# Patient Record
Sex: FEMALE | Race: BLACK OR AFRICAN AMERICAN | NOT HISPANIC OR LATINO | Employment: UNEMPLOYED | URBAN - METROPOLITAN AREA
[De-identification: names, ages, dates, MRNs, and addresses within clinical notes are randomized per-mention and may not be internally consistent; named-entity substitution may affect disease eponyms.]

---

## 2023-09-06 LAB — URINE CULTURE: NO GROWTH

## 2023-09-12 RX ORDER — PYRIDOXINE HCL (VITAMIN B6) 100 MG
100 TABLET ORAL DAILY
COMMUNITY
End: 2024-04-24 | Stop reason: WASHOUT

## 2023-09-12 RX ORDER — PRENATAL VIT,CAL 74/IRON/FOLIC 27 MG-1 MG
TABLET ORAL
COMMUNITY
End: 2024-04-24 | Stop reason: WASHOUT

## 2023-09-19 LAB
ERYTHROCYTE DISTRIBUTION WIDTH (RATIO) BY AUTOMATED COUNT: 14.1 % (ref 11.5–14.5)
ERYTHROCYTE MEAN CORPUSCULAR HEMOGLOBIN CONCENTRATION (G/DL) BY AUTOMATED: 31.8 G/DL (ref 32–36)
ERYTHROCYTE MEAN CORPUSCULAR VOLUME (FL) BY AUTOMATED COUNT: 89 FL (ref 80–100)
ERYTHROCYTES (10*6/UL) IN BLOOD BY AUTOMATED COUNT: 3.72 X10E12/L (ref 4–5.2)
HEMATOCRIT (%) IN BLOOD BY AUTOMATED COUNT: 33 % (ref 36–46)
HEMOGLOBIN (G/DL) IN BLOOD: 10.5 G/DL (ref 12–16)
LEUKOCYTES (10*3/UL) IN BLOOD BY AUTOMATED COUNT: 8.3 X10E9/L (ref 4.4–11.3)
PLATELETS (10*3/UL) IN BLOOD AUTOMATED COUNT: 359 X10E9/L (ref 150–450)
REFLEX ADDED, ANEMIA PANEL: ABNORMAL

## 2023-09-20 LAB
ABO GROUP (TYPE) IN BLOOD: NORMAL
ANTIBODY SCREEN: NORMAL
FERRITIN, PREGNANCY: 48 UG/L
FOLATE, SERUM, PREGNANCY: >24 NG/ML
HEPATITIS B VIRUS SURFACE AG PRESENCE IN SERUM: NONREACTIVE
IRON (UG/DL) IN SER/PLAS IN PREGNANCY: 94 UG/DL
IRON BINDING CAPACITY (UG/DL) IN PREGNANCY: 308 UG/DL
IRON SATURATION (%) IN PREGNANCY: 31 %
RH FACTOR: NORMAL
RUBELLA VIRUS IGG AB: POSITIVE
SYPHILIS TOTAL AB: NONREACTIVE
VITAMIN B12, PREGNANCY: 204 PG/ML

## 2023-09-21 LAB — HIV 1/ 2 AG/AB SCREEN: NONREACTIVE

## 2023-09-28 LAB — SMA RESULT: NORMAL

## 2023-09-29 LAB — CF RESULTS: NORMAL

## 2023-10-03 ENCOUNTER — ROUTINE PRENATAL (OUTPATIENT)
Dept: OBSTETRICS AND GYNECOLOGY | Facility: CLINIC | Age: 28
End: 2023-10-03
Payer: MEDICAID

## 2023-10-03 VITALS — WEIGHT: 163 LBS | DIASTOLIC BLOOD PRESSURE: 64 MMHG | SYSTOLIC BLOOD PRESSURE: 104 MMHG

## 2023-10-03 DIAGNOSIS — Z3A.17 17 WEEKS GESTATION OF PREGNANCY (HHS-HCC): ICD-10-CM

## 2023-10-03 DIAGNOSIS — Z34.02 PRIMIPAROUS, SECOND TRIMESTER (HHS-HCC): Primary | ICD-10-CM

## 2023-10-03 DIAGNOSIS — O99.012 ANEMIA AFFECTING PREGNANCY IN SECOND TRIMESTER (HHS-HCC): ICD-10-CM

## 2023-10-03 PROCEDURE — 99213 OFFICE O/P EST LOW 20 MIN: CPT | Performed by: OBSTETRICS & GYNECOLOGY

## 2023-10-03 NOTE — ASSESSMENT & PLAN NOTE
Doing well with no concerns  Reviewed lab work from [facility] ultrasound  Will obtain hemoglobin electrophoresis, has appointment with hematology on October 9  We will start patient on ASA 2 daily  Anatomy ultrasound scheduled October 16  PLAN:  Hemoglobin electrophoresis and follow-up in 4 weeks

## 2023-10-03 NOTE — PROGRESS NOTES
Subjective   Patient ID [id number]   Briana Phelps is a 28 y.o.  at 17w2d with a working estimated date of delivery of 3/10/2024, by Last Menstrual Period who presents for a routine prenatal visit. She denies vaginal bleeding, leakage of fluid, decreased fetal movements, or contractions.    Patient doing well with no concerns    Objective   Physical Exam  Weight: 73.9 kg (163 lb)  Expected Total Weight Gain: Could not be calculated   Pregravid BMI: Could not be calculated  BP: 104/64  Fetal Heart Rate: 150 Fundal Height (cm): 20 cm    Prenatal Labs  Urine dip:  Lab Results   Component Value Date    KETONESU Negative 2023       Lab Results   Component Value Date    HGB 10.1 (L) 2023    HCT 31.2 (L) 2023    HEPBSAG NONREACTIVE 2023              Assessment/Plan   Problem List Items Addressed This Visit             ICD-10-CM    Primiparous, second trimester - Primary Z34.02     Doing well with no concerns  Reviewed lab work from [facility] ultrasound  Will obtain hemoglobin electrophoresis, has appointment with hematology on   We will start patient on ASA 2 daily  Anatomy ultrasound scheduled   PLAN:  Hemoglobin electrophoresis and follow-up in 4 weeks         Anemia affecting pregnancy in second trimester O99.012     Hgb 10.1 with MCV of 88 and ferritin 48  Patient denies sickle cell  We will obtain hemoglobin electrophoresis  Scheduled for hematology consult on          17 weeks gestation of pregnancy Z3A.17

## 2023-10-03 NOTE — ASSESSMENT & PLAN NOTE
Hgb 10.1 with MCV of 88 and ferritin 48  Patient denies sickle cell  We will obtain hemoglobin electrophoresis  Scheduled for hematology consult on October 9

## 2023-10-09 ENCOUNTER — OFFICE VISIT (OUTPATIENT)
Dept: HEMATOLOGY/ONCOLOGY | Facility: CLINIC | Age: 28
End: 2023-10-09
Payer: MEDICAID

## 2023-10-09 VITALS
BODY MASS INDEX: 26.93 KG/M2 | HEIGHT: 66 IN | OXYGEN SATURATION: 100 % | WEIGHT: 167.55 LBS | SYSTOLIC BLOOD PRESSURE: 136 MMHG | HEART RATE: 111 BPM | TEMPERATURE: 98.2 F | RESPIRATION RATE: 18 BRPM | DIASTOLIC BLOOD PRESSURE: 77 MMHG

## 2023-10-09 DIAGNOSIS — D63.8 ANEMIA OF CHRONIC DISEASE: Primary | ICD-10-CM

## 2023-10-09 LAB
ALBUMIN SERPL BCP-MCNC: 3.8 G/DL (ref 3.4–5)
ALP SERPL-CCNC: 44 U/L (ref 33–110)
ALT SERPL W P-5'-P-CCNC: 35 U/L (ref 7–45)
ANION GAP SERPL CALC-SCNC: 9 MMOL/L (ref 10–20)
AST SERPL W P-5'-P-CCNC: 23 U/L (ref 9–39)
BASOPHILS # BLD AUTO: 0.02 X10*3/UL (ref 0–0.1)
BASOPHILS NFR BLD AUTO: 0.3 %
BILIRUB SERPL-MCNC: 0.3 MG/DL (ref 0–1.2)
BUN SERPL-MCNC: 5 MG/DL (ref 6–23)
CALCIUM SERPL-MCNC: 9.4 MG/DL (ref 8.6–10.3)
CHLORIDE SERPL-SCNC: 106 MMOL/L (ref 98–107)
CO2 SERPL-SCNC: 23 MMOL/L (ref 21–32)
CREAT SERPL-MCNC: 0.49 MG/DL (ref 0.5–1.05)
EOSINOPHIL # BLD AUTO: 0.05 X10*3/UL (ref 0–0.7)
EOSINOPHIL NFR BLD AUTO: 0.6 %
ERYTHROCYTE [DISTWIDTH] IN BLOOD BY AUTOMATED COUNT: 13.9 % (ref 11.5–14.5)
FERRITIN SERPL-MCNC: 29 NG/ML (ref 8–150)
FOLATE SERPL-MCNC: 21.9 NG/ML
GFR SERPL CREATININE-BSD FRML MDRD: >90 ML/MIN/1.73M*2
GLUCOSE SERPL-MCNC: 89 MG/DL (ref 74–99)
HCT VFR BLD AUTO: 31.6 % (ref 36–46)
HGB BLD-MCNC: 10.1 G/DL (ref 12–16)
IMM GRANULOCYTES # BLD AUTO: 0.04 X10*3/UL (ref 0–0.7)
IMM GRANULOCYTES NFR BLD AUTO: 0.5 % (ref 0–0.9)
LYMPHOCYTES # BLD AUTO: 1.48 X10*3/UL (ref 1.2–4.8)
LYMPHOCYTES NFR BLD AUTO: 19 %
MCH RBC QN AUTO: 28.4 PG (ref 26–34)
MCHC RBC AUTO-ENTMCNC: 32 G/DL (ref 32–36)
MCV RBC AUTO: 89 FL (ref 80–100)
MONOCYTES # BLD AUTO: 0.37 X10*3/UL (ref 0.1–1)
MONOCYTES NFR BLD AUTO: 4.7 %
NEUTROPHILS # BLD AUTO: 5.83 X10*3/UL (ref 1.2–7.7)
NEUTROPHILS NFR BLD AUTO: 74.9 %
PLATELET # BLD AUTO: 270 X10*3/UL (ref 150–450)
PMV BLD AUTO: 8.3 FL (ref 7.5–11.5)
POTASSIUM SERPL-SCNC: 3.5 MMOL/L (ref 3.5–5.3)
PROT SERPL-MCNC: 6.8 G/DL (ref 6.4–8.2)
RBC # BLD AUTO: 3.56 X10*6/UL (ref 4–5.2)
SODIUM SERPL-SCNC: 134 MMOL/L (ref 136–145)
TSH SERPL-ACNC: 1.18 MIU/L (ref 0.44–3.98)
VIT B12 SERPL-MCNC: 260 PG/ML (ref 211–911)
WBC # BLD AUTO: 7.8 X10*3/UL (ref 4.4–11.3)

## 2023-10-09 PROCEDURE — 82746 ASSAY OF FOLIC ACID SERUM: CPT | Performed by: NURSE PRACTITIONER

## 2023-10-09 PROCEDURE — 80053 COMPREHEN METABOLIC PANEL: CPT | Performed by: NURSE PRACTITIONER

## 2023-10-09 PROCEDURE — 83021 HEMOGLOBIN CHROMOTOGRAPHY: CPT | Performed by: NURSE PRACTITIONER

## 2023-10-09 PROCEDURE — 36415 COLL VENOUS BLD VENIPUNCTURE: CPT | Performed by: NURSE PRACTITIONER

## 2023-10-09 PROCEDURE — 83020 HEMOGLOBIN ELECTROPHORESIS: CPT | Performed by: NURSE PRACTITIONER

## 2023-10-09 PROCEDURE — 82607 VITAMIN B-12: CPT | Performed by: NURSE PRACTITIONER

## 2023-10-09 PROCEDURE — 82728 ASSAY OF FERRITIN: CPT | Performed by: NURSE PRACTITIONER

## 2023-10-09 PROCEDURE — 99215 OFFICE O/P EST HI 40 MIN: CPT | Performed by: NURSE PRACTITIONER

## 2023-10-09 PROCEDURE — 1036F TOBACCO NON-USER: CPT | Performed by: NURSE PRACTITIONER

## 2023-10-09 PROCEDURE — 85025 COMPLETE CBC W/AUTO DIFF WBC: CPT | Performed by: NURSE PRACTITIONER

## 2023-10-09 PROCEDURE — 84443 ASSAY THYROID STIM HORMONE: CPT | Performed by: NURSE PRACTITIONER

## 2023-10-09 PROCEDURE — 99205 OFFICE O/P NEW HI 60 MIN: CPT | Performed by: NURSE PRACTITIONER

## 2023-10-09 ASSESSMENT — PAIN SCALES - GENERAL: PAINLEVEL: 0-NO PAIN

## 2023-10-09 NOTE — PROGRESS NOTES
"Patient ID: Briana Phelps is a 28 y.o. female.  Referring Physician: Fely Hess  Visit Type: Initial Visit      Subjective    Location:  [facility] [place]  Initial consult: [id number]  Reason: gestational anemia  Referred by Fely Hess  Initial Consult.    Patient is a 29 yo  woman with a PMH of anemia  and was referred to benign hematology for consultation of  gestational anemia. .  Due date 2024. Recently hospitalized for abdominal pain. Remote history of anemia.     Today, patient presents for initial consultation. Denies abnormal weight loss, night sweats, fever. Denies fatigue, chills, sob, cp, n/v/d, n/t. No PICA. Denies any abnormal bleeding or bruising. No recurrent infections or lymphadenopathy. No joint/body pain. No known blood disorders in family. Has had surgery in past w/o issue. Never had blood/blood products. Denies NSAID use.     Noted that this patient has had a hemoglobin in the mid 10 range for the past few months. No other hematology labs noted.     Past Medical Hx: first pregnancy, remote history of iron deficiency anemia 2/2 to menorrhagia  Past Surgical Hx: Negative  Family Hx: No hemoglobinopathy or sickle cell disease, parents and siblings healthy: all live in [place].  Social Hx: no smoking, ETOH, or recreational drugs. Currently working full time        Review of Systems   All other systems reviewed and are negative.       Objective   BSA: 1.89 meters squared  /77 (BP Location: Right arm, Patient Position: Sitting, BP Cuff Size: Adult)   Pulse (!) 111   Temp 36.8 °C (98.2 °F) (Temporal)   Resp 18   Ht 1.689 m (5' 6.5\")   Wt 76 kg (167 lb 8.8 oz)   LMP 2023 (Exact Date)   SpO2 100%   BMI 26.64 kg/m²      has a past medical history of Nausea/vomiting in pregnancy.   has no past surgical history on file.  No family history on file.  Oncology History    No history exists.   Family History:    Briana Phelps  reports that she " "has never smoked. She has never used smokeless tobacco.  She  reports that she does not currently use alcohol.  She  reports that she does not currently use drugs.    Physical Exam  HENT:      Head: Normocephalic.      Nose: Nose normal.      Mouth/Throat:      Mouth: Mucous membranes are moist.   Eyes:      Pupils: Pupils are equal, round, and reactive to light.   Cardiovascular:      Rate and Rhythm: Normal rate and regular rhythm.      Pulses: Normal pulses.      Heart sounds: Normal heart sounds.   Pulmonary:      Effort: Pulmonary effort is normal.      Breath sounds: Normal breath sounds.   Abdominal:      General: Bowel sounds are normal.      Palpations: Abdomen is soft.   Musculoskeletal:         General: Normal range of motion.   Skin:     General: Skin is warm and dry.   Neurological:      General: No focal deficit present.      Mental Status: She is alert and oriented to person, place, and time.   Psychiatric:         Mood and Affect: Mood normal.         Behavior: Behavior normal.       WBC   Date/Time Value Ref Range Status   10/09/2023 11:14 AM 7.8 4.4 - 11.3 x10*3/uL Final   09/28/2023 02:34 AM 10.1 4.4 - 11.3 x10E9/L Final   09/19/2023 01:43 AM 8.3 4.4 - 11.3 x10E9/L Final     No results found for: \"NRBC\"  RBC   Date Value Ref Range Status   10/09/2023 3.56 (L) 4.00 - 5.20 x10*6/uL Final   09/28/2023 3.55 (L) 4.00 - 5.20 x10E12/L Final   09/19/2023 3.72 (L) 4.00 - 5.20 x10E12/L Final     Hemoglobin   Date Value Ref Range Status   10/09/2023 10.1 (L) 12.0 - 16.0 g/dL Final   09/28/2023 10.1 (L) 12.0 - 16.0 g/dL Final   09/19/2023 10.5 (L) 12.0 - 16.0 g/dL Final     Hematocrit   Date Value Ref Range Status   10/09/2023 31.6 (L) 36.0 - 46.0 % Final   09/28/2023 31.2 (L) 36.0 - 46.0 % Final   09/19/2023 33.0 (L) 36.0 - 46.0 % Final     MCV   Date/Time Value Ref Range Status   10/09/2023 11:14 AM 89 80 - 100 fL Final   09/28/2023 02:34 AM 88 80 - 100 fL Final   09/19/2023 01:43 AM 89 80 - 100 fL Final "     MCH   Date/Time Value Ref Range Status   10/09/2023 11:14 AM 28.4 26.0 - 34.0 pg Final     MCHC   Date/Time Value Ref Range Status   10/09/2023 11:14 AM 32.0 32.0 - 36.0 g/dL Final   09/28/2023 02:34 AM 32.4 32.0 - 36.0 g/dL Final   09/19/2023 01:43 AM 31.8 (L) 32.0 - 36.0 g/dL Final     RDW   Date/Time Value Ref Range Status   10/09/2023 11:14 AM 13.9 11.5 - 14.5 % Final   09/28/2023 02:34 AM 14.0 11.5 - 14.5 % Final   09/19/2023 01:43 AM 14.1 11.5 - 14.5 % Final     Platelets   Date/Time Value Ref Range Status   10/09/2023 11:14  150 - 450 x10*3/uL Final   09/28/2023 02:34  150 - 450 x10E9/L Final   09/19/2023 01:43  150 - 450 x10E9/L Final     MPV   Date/Time Value Ref Range Status   10/09/2023 11:14 AM 8.3 7.5 - 11.5 fL Final     Neutrophils %   Date/Time Value Ref Range Status   10/09/2023 11:14 AM 74.9 40.0 - 80.0 % Final   09/28/2023 02:34 AM 67.5 40.0 - 80.0 % Final     Immature Granulocytes %, Automated   Date/Time Value Ref Range Status   10/09/2023 11:14 AM 0.5 0.0 - 0.9 % Final     Comment:     Immature Granulocyte Count (IG) includes promyelocytes, myelocytes and metamyelocytes but does not include bands. Percent differential counts (%) should be interpreted in the context of the absolute cell counts (cells/UL).   09/28/2023 02:34 AM 0.5 0.0 - 0.9 % Final     Comment:      Immature Granulocyte Count (IG) includes promyelocytes,    myelocytes and metamyelocytes but does not include bands.   Percent differential counts (%) should be interpreted in the   context of the absolute cell counts (cells/L).       Lymphocytes %   Date/Time Value Ref Range Status   10/09/2023 11:14 AM 19.0 13.0 - 44.0 % Final   09/28/2023 02:34 AM 24.6 13.0 - 44.0 % Final     Monocytes %   Date/Time Value Ref Range Status   10/09/2023 11:14 AM 4.7 2.0 - 10.0 % Final   09/28/2023 02:34 AM 6.3 2.0 - 10.0 % Final     Eosinophils %   Date/Time Value Ref Range Status   10/09/2023 11:14 AM 0.6 0.0 - 6.0 % Final    2023 02:34 AM 0.9 0.0 - 6.0 % Final     Basophils %   Date/Time Value Ref Range Status   10/09/2023 11:14 AM 0.3 0.0 - 2.0 % Final   2023 02:34 AM 0.2 0.0 - 2.0 % Final     Neutrophils Absolute   Date/Time Value Ref Range Status   10/09/2023 11:14 AM 5.83 1.20 - 7.70 x10*3/uL Final     Comment:     Percent differential counts (%) should be interpreted in the context of the absolute cell counts (cells/uL).   2023 02:34 AM 6.80 1.20 - 7.70 x10E9/L Final     Immature Granulocytes Absolute, Automated   Date/Time Value Ref Range Status   10/09/2023 11:14 AM 0.04 0.00 - 0.70 x10*3/uL Final     Lymphocytes Absolute   Date/Time Value Ref Range Status   10/09/2023 11:14 AM 1.48 1.20 - 4.80 x10*3/uL Final   2023 02:34 AM 2.47 1.20 - 4.80 x10E9/L Final     Monocytes Absolute   Date/Time Value Ref Range Status   10/09/2023 11:14 AM 0.37 0.10 - 1.00 x10*3/uL Final   2023 02:34 AM 0.63 0.10 - 1.00 x10E9/L Final     Eosinophils Absolute   Date/Time Value Ref Range Status   10/09/2023 11:14 AM 0.05 0.00 - 0.70 x10*3/uL Final   2023 02:34 AM 0.09 0.00 - 0.70 x10E9/L Final     Basophils Absolute   Date/Time Value Ref Range Status   10/09/2023 11:14 AM 0.02 0.00 - 0.10 x10*3/uL Final   2023 02:34 AM 0.02 0.00 - 0.10 x10E9/L Final         Assessment/Plan    27 yo  woman with a PMH of anemia  nd was referred to benign hematology for consultation of  gestational anemia. .  Due date 2024. Recently hospitalized for abdominal pain. Remote history of iron deficiency anemia. [place] from [facility]. Patient is feeeling well with her first pregnancy. No complaints noted today. Denies fatigue, pica, bruising, bleeding MAHER or other symptoms of anemia.     Discussed possible etiologies including multifactorial, nutritional deficiencies, getational anemia, dilutional anemia, anemia of chronic disease, malabsorption, hemopathy, medications, bleeding, malignancy, etc.  Will start hematological workup today.     1. Labs today: CBC/diff, CMP, retics, iron studies, B12, folate, EPO level, TSH, PENNY. So far hemoglobin stable.   2. We will schedule a phone visit in 1 week to discuss results and make further recommendations.   3. Plan for monthly CBC diff and iron studies, patient should continue with prenatal vitamins and we will notify her if she needs additional supplementation.     I had an extensive discussion with the patient regarding the diagnosis and discussed the plan of therapy, including general considerations regarding side effects and outcomes. Pt understood and gave appropriate teach back about the plan of care. All questions were answered to the patient's satisfaction. The patient is instructed to contact us at any time if questions or problems arise. Thank you for the opportunity to participate in the care of this very pleasant patient.  Total time = 60 mins, of which >50% was spent in counseling and/or coordination of care including reviewing medical history/radiology/labs, examining patient, formulating outlined plan with team, and discussing plan with patient/family.    Rosanne M Casal, DNP, APN-BC, AOCNP

## 2023-10-10 LAB
HEMOGLOBIN A2: 2.6 % (ref 2–3.5)
HEMOGLOBIN A: 97.1 % (ref 95.8–98)
HEMOGLOBIN C: 0 %
HEMOGLOBIN F: 0.3 % (ref 0–2)
HEMOGLOBIN IDENTIFICATION INTERPRETATION: NORMAL
HEMOGLOBIN OTHER: 0 %
HEMOGLOBIN S: 0 %
PATH REVIEW-HGB IDENTIFICATION: NORMAL

## 2023-10-16 ENCOUNTER — ANCILLARY PROCEDURE (OUTPATIENT)
Dept: RADIOLOGY | Facility: CLINIC | Age: 28
End: 2023-10-16
Payer: MEDICAID

## 2023-10-16 DIAGNOSIS — O35.9XX0 FETAL ABNORMALITY AFFECTING MANAGEMENT OF MOTHER (HHS-HCC): ICD-10-CM

## 2023-10-16 PROCEDURE — 76811 OB US DETAILED SNGL FETUS: CPT

## 2023-10-16 PROCEDURE — 76811 OB US DETAILED SNGL FETUS: CPT | Performed by: OBSTETRICS & GYNECOLOGY

## 2023-10-24 ENCOUNTER — APPOINTMENT (OUTPATIENT)
Dept: OBSTETRICS AND GYNECOLOGY | Facility: CLINIC | Age: 28
End: 2023-10-24
Payer: MEDICAID

## 2023-10-24 PROBLEM — Z3A.20 20 WEEKS GESTATION OF PREGNANCY (HHS-HCC): Status: ACTIVE | Noted: 2023-10-03

## 2023-10-25 ENCOUNTER — ROUTINE PRENATAL (OUTPATIENT)
Dept: OBSTETRICS AND GYNECOLOGY | Facility: CLINIC | Age: 28
End: 2023-10-25
Payer: MEDICAID

## 2023-10-25 VITALS — DIASTOLIC BLOOD PRESSURE: 64 MMHG | SYSTOLIC BLOOD PRESSURE: 108 MMHG | BODY MASS INDEX: 26.55 KG/M2 | WEIGHT: 167 LBS

## 2023-10-25 DIAGNOSIS — Z3A.20 20 WEEKS GESTATION OF PREGNANCY (HHS-HCC): Primary | ICD-10-CM

## 2023-10-25 DIAGNOSIS — O99.012 ANEMIA DURING PREGNANCY IN SECOND TRIMESTER (HHS-HCC): ICD-10-CM

## 2023-10-25 DIAGNOSIS — Z34.02 PRIMIPAROUS, SECOND TRIMESTER (HHS-HCC): ICD-10-CM

## 2023-10-25 PROCEDURE — 99213 OFFICE O/P EST LOW 20 MIN: CPT | Performed by: OBSTETRICS & GYNECOLOGY

## 2023-10-25 RX ORDER — FERROUS SULFATE 325(65) MG
TABLET ORAL
Qty: 60 TABLET | Refills: 5 | Status: SHIPPED | OUTPATIENT
Start: 2023-10-25

## 2023-10-25 RX ORDER — DOCUSATE SODIUM 100 MG/1
CAPSULE, LIQUID FILLED ORAL
Qty: 60 CAPSULE | Refills: 5 | Status: SHIPPED | OUTPATIENT
Start: 2023-10-25 | End: 2024-01-16 | Stop reason: SDUPTHER

## 2023-10-25 NOTE — PROGRESS NOTES
Subjective   Patient ID [id number]   Briana Phelps is a 28 y.o.  at 20w3d with a working estimated date of delivery of 3/10/2024, by Last Menstrual Period who presents for a routine prenatal visit. She denies vaginal bleeding, leakage of fluid, decreased fetal movements, or contractions.    Patient continues with mild nausea, also occasional headache.  Patient otherwise doing well, positive fetal movement.  Reviewed ultrasound from , patient also saw hematologist on .  Patient is on vitamins and aspirin, will send prescription for iron.    Objective   Physical Exam  Weight: 75.8 kg (167 lb)  Expected Total Weight Gain: Could not be calculated   Pregravid BMI: Could not be calculated  BP: 108/64  Fetal Heart Rate: 150 Fundal Height (cm): 20 cm    Prenatal Labs  Urine dip:  Lab Results   Component Value Date    KETONESU Negative 2023       Lab Results   Component Value Date    HGB 10.1 (L) 10/09/2023    HCT 31.6 (L) 10/09/2023    ABO B 2023    HEPBSAG NONREACTIVE 2023         Assessment/Plan   Problem List Items Addressed This Visit             ICD-10-CM    Primiparous, second trimester Z34.02    20 weeks gestation of pregnancy - Primary Z3A.20     Patient with good fetal movement, on vitamins and ASA,   Patient did see hematology, will start on iron,  --Patient started on ASA 2 daily on October 3  --Normal anatomy scan on   -- ANEMIA: Hgb 10.1, MCV 88, ferritin 48, saw hematology 10/9, will start iron    PLAN:  Iron,  Follow-up in 4 weeks

## 2023-10-25 NOTE — ASSESSMENT & PLAN NOTE
Patient with good fetal movement, on vitamins and ASA,   Patient did see hematology, will start on iron,  --Patient started on ASA 2 daily on October 3  --Normal anatomy scan on October 17  -- ANEMIA: Hgb 10.1, MCV 88, ferritin 48, saw hematology 10/9, will start iron    PLAN:  Iron,  Follow-up in 4 weeks

## 2023-10-30 ENCOUNTER — APPOINTMENT (OUTPATIENT)
Dept: HEMATOLOGY/ONCOLOGY | Facility: CLINIC | Age: 28
End: 2023-10-30
Payer: COMMERCIAL

## 2023-11-06 ENCOUNTER — APPOINTMENT (OUTPATIENT)
Dept: HEMATOLOGY/ONCOLOGY | Facility: CLINIC | Age: 28
End: 2023-11-06
Payer: MEDICAID

## 2023-11-06 ENCOUNTER — APPOINTMENT (OUTPATIENT)
Dept: LAB | Facility: HOSPITAL | Age: 28
End: 2023-11-06
Payer: MEDICAID

## 2023-11-07 DIAGNOSIS — O99.012 ANEMIA AFFECTING PREGNANCY IN SECOND TRIMESTER (HHS-HCC): Primary | ICD-10-CM

## 2023-11-14 ENCOUNTER — TELEMEDICINE (OUTPATIENT)
Dept: HEMATOLOGY/ONCOLOGY | Facility: HOSPITAL | Age: 28
End: 2023-11-14
Payer: MEDICAID

## 2023-11-14 ENCOUNTER — APPOINTMENT (OUTPATIENT)
Dept: HEMATOLOGY/ONCOLOGY | Facility: CLINIC | Age: 28
End: 2023-11-14
Payer: MEDICAID

## 2023-11-14 DIAGNOSIS — O99.012 ANEMIA AFFECTING PREGNANCY IN SECOND TRIMESTER (HHS-HCC): Primary | ICD-10-CM

## 2023-11-14 DIAGNOSIS — D63.8 ANEMIA OF CHRONIC DISEASE: ICD-10-CM

## 2023-11-14 PROCEDURE — 99214 OFFICE O/P EST MOD 30 MIN: CPT | Performed by: NURSE PRACTITIONER

## 2023-11-15 ENCOUNTER — LAB (OUTPATIENT)
Dept: LAB | Facility: LAB | Age: 28
End: 2023-11-15
Payer: MEDICAID

## 2023-11-15 DIAGNOSIS — O99.012 ANEMIA AFFECTING PREGNANCY IN SECOND TRIMESTER (HHS-HCC): ICD-10-CM

## 2023-11-15 DIAGNOSIS — D63.8 ANEMIA OF CHRONIC DISEASE: ICD-10-CM

## 2023-11-15 LAB
ALBUMIN SERPL BCP-MCNC: 3.6 G/DL (ref 3.4–5)
ALP SERPL-CCNC: 50 U/L (ref 33–110)
ALT SERPL W P-5'-P-CCNC: 20 U/L (ref 7–45)
ANION GAP SERPL CALC-SCNC: 11 MMOL/L (ref 10–20)
AST SERPL W P-5'-P-CCNC: 17 U/L (ref 9–39)
BASOPHILS # BLD AUTO: 0.03 X10*3/UL (ref 0–0.1)
BASOPHILS NFR BLD AUTO: 0.3 %
BILIRUB SERPL-MCNC: 0.3 MG/DL (ref 0–1.2)
BUN SERPL-MCNC: 5 MG/DL (ref 6–23)
CALCIUM SERPL-MCNC: 8.7 MG/DL (ref 8.6–10.3)
CHLORIDE SERPL-SCNC: 105 MMOL/L (ref 98–107)
CO2 SERPL-SCNC: 24 MMOL/L (ref 21–32)
CREAT SERPL-MCNC: 0.57 MG/DL (ref 0.5–1.05)
EOSINOPHIL # BLD AUTO: 0.07 X10*3/UL (ref 0–0.7)
EOSINOPHIL NFR BLD AUTO: 0.7 %
ERYTHROCYTE [DISTWIDTH] IN BLOOD BY AUTOMATED COUNT: 13.6 % (ref 11.5–14.5)
FERRITIN SERPL-MCNC: 49 NG/ML (ref 8–150)
FOLATE SERPL-MCNC: 16.6 NG/ML
GFR SERPL CREATININE-BSD FRML MDRD: >90 ML/MIN/1.73M*2
GLUCOSE SERPL-MCNC: 77 MG/DL (ref 74–99)
HCT VFR BLD AUTO: 32.2 % (ref 36–46)
HGB BLD-MCNC: 10.3 G/DL (ref 12–16)
IMM GRANULOCYTES # BLD AUTO: 0.13 X10*3/UL (ref 0–0.7)
IMM GRANULOCYTES NFR BLD AUTO: 1.3 % (ref 0–0.9)
IRON SATN MFR SERPL: 19 % (ref 25–45)
IRON SERPL-MCNC: 61 UG/DL (ref 35–150)
LYMPHOCYTES # BLD AUTO: 1.79 X10*3/UL (ref 1.2–4.8)
LYMPHOCYTES NFR BLD AUTO: 18.5 %
MCH RBC QN AUTO: 28.9 PG (ref 26–34)
MCHC RBC AUTO-ENTMCNC: 32 G/DL (ref 32–36)
MCV RBC AUTO: 90 FL (ref 80–100)
MONOCYTES # BLD AUTO: 0.51 X10*3/UL (ref 0.1–1)
MONOCYTES NFR BLD AUTO: 5.3 %
NEUTROPHILS # BLD AUTO: 7.12 X10*3/UL (ref 1.2–7.7)
NEUTROPHILS NFR BLD AUTO: 73.9 %
NRBC BLD-RTO: 0 /100 WBCS (ref 0–0)
PLATELET # BLD AUTO: 323 X10*3/UL (ref 150–450)
POTASSIUM SERPL-SCNC: 3.7 MMOL/L (ref 3.5–5.3)
PROT SERPL-MCNC: 5.9 G/DL (ref 6.4–8.2)
RBC # BLD AUTO: 3.56 X10*6/UL (ref 4–5.2)
SODIUM SERPL-SCNC: 136 MMOL/L (ref 136–145)
TIBC SERPL-MCNC: 328 UG/DL (ref 240–445)
UIBC SERPL-MCNC: 267 UG/DL (ref 110–370)
VIT B12 SERPL-MCNC: 160 PG/ML (ref 211–911)
WBC # BLD AUTO: 9.7 X10*3/UL (ref 4.4–11.3)

## 2023-11-15 PROCEDURE — 83021 HEMOGLOBIN CHROMOTOGRAPHY: CPT

## 2023-11-15 PROCEDURE — 82607 VITAMIN B-12: CPT

## 2023-11-15 PROCEDURE — 85025 COMPLETE CBC W/AUTO DIFF WBC: CPT

## 2023-11-15 PROCEDURE — 83540 ASSAY OF IRON: CPT

## 2023-11-15 PROCEDURE — 80053 COMPREHEN METABOLIC PANEL: CPT

## 2023-11-15 PROCEDURE — 36415 COLL VENOUS BLD VENIPUNCTURE: CPT

## 2023-11-15 PROCEDURE — 83550 IRON BINDING TEST: CPT

## 2023-11-15 PROCEDURE — 83020 HEMOGLOBIN ELECTROPHORESIS: CPT

## 2023-11-15 PROCEDURE — 82746 ASSAY OF FOLIC ACID SERUM: CPT

## 2023-11-15 PROCEDURE — 82728 ASSAY OF FERRITIN: CPT

## 2023-11-16 LAB
HEMOGLOBIN A2: 2.5 % (ref 2–3.5)
HEMOGLOBIN A: 97.2 % (ref 95.8–98)
HEMOGLOBIN F: 0.3 % (ref 0–2)
HEMOGLOBIN IDENTIFICATION INTERPRETATION: NORMAL
PATH REVIEW-HGB IDENTIFICATION: NORMAL

## 2023-11-20 PROBLEM — Z3A.24 24 WEEKS GESTATION OF PREGNANCY (HHS-HCC): Status: ACTIVE | Noted: 2023-10-03

## 2023-11-21 ENCOUNTER — ROUTINE PRENATAL (OUTPATIENT)
Dept: OBSTETRICS AND GYNECOLOGY | Facility: CLINIC | Age: 28
End: 2023-11-21
Payer: MEDICAID

## 2023-11-21 VITALS — BODY MASS INDEX: 27.67 KG/M2 | SYSTOLIC BLOOD PRESSURE: 120 MMHG | DIASTOLIC BLOOD PRESSURE: 78 MMHG | WEIGHT: 174 LBS

## 2023-11-21 DIAGNOSIS — O99.612 CONSTIPATION DURING PREGNANCY IN SECOND TRIMESTER (HHS-HCC): ICD-10-CM

## 2023-11-21 DIAGNOSIS — K59.00 CONSTIPATION DURING PREGNANCY IN SECOND TRIMESTER (HHS-HCC): ICD-10-CM

## 2023-11-21 DIAGNOSIS — Z3A.24 24 WEEKS GESTATION OF PREGNANCY (HHS-HCC): ICD-10-CM

## 2023-11-21 DIAGNOSIS — O99.012 ANEMIA AFFECTING PREGNANCY IN SECOND TRIMESTER (HHS-HCC): Primary | ICD-10-CM

## 2023-11-21 PROCEDURE — 99213 OFFICE O/P EST LOW 20 MIN: CPT | Performed by: OBSTETRICS & GYNECOLOGY

## 2023-11-21 RX ORDER — VITAMIN A, VITAMIN C, VITAMIN D, VITAMIN E, THIAMINE, RIBOFLAVIN, NIACIN, VITAMIN B6, FOLIC ACID, VITAMIN B12, CALCIUM, IRON, ZINC, COPPER 4000; 120; 400; 22; 1.84; 3; 20; 10; 1; 12; 200; 27; 25; 2 [IU]/1; MG/1; [IU]/1; [IU]/1; MG/1; MG/1; MG/1; MG/1; MG/1; UG/1; MG/1; MG/1; MG/1; MG/1
1 TABLET ORAL DAILY
COMMUNITY
Start: 2023-07-11 | End: 2024-04-24 | Stop reason: WASHOUT

## 2023-11-21 RX ORDER — POLYETHYLENE GLYCOL 3350 17 G/17G
POWDER, FOR SOLUTION ORAL
Qty: 595 G | Refills: 2 | Status: SHIPPED | OUTPATIENT
Start: 2023-11-21

## 2023-11-21 NOTE — ASSESSMENT & PLAN NOTE
IUP at 24.2 weeks patient doing well with no concerns  Good fetal movement  Complaint of constipation, currently on Colace, will add MiraLAX    PLAN  50 g with CBC and RPR  Follow-up in 4 weeks

## 2023-11-21 NOTE — PROGRESS NOTES
Subjective     Briana Phelps is a 28 y.o.  at 24w2d with a working estimated date of delivery of 3/10/2024, by Last Menstrual Period who presents for a routine prenatal visit.     Patient with good fetal movement, doing well with no concerns  Complaint of constipation, currently on Colace    Objective   Physical Exam  Weight: 78.9 kg (174 lb)  Expected Total Weight Gain: 7 kg (15 lb)-11.5 kg (25 lb)   Pregravid BMI: 26.24  BP: 120/78  Fetal Heart Rate: 150 Fundal Height (cm): 24 cm    Prenatal Labs  Urine dip:  Lab Results   Component Value Date    KETONESU Negative 2023       Lab Results   Component Value Date    HGB 10.3 (L) 11/15/2023    HCT 32.2 (L) 11/15/2023    ABO B 2023    HEPBSAG NONREACTIVE 2023             Problem List Items Addressed This Visit       Anemia affecting pregnancy in second trimester - Primary    Overview     Hgb 10.1 with MCV of 88 and ferritin 48  Patient denies sickle cell  Normal hemoglobin electrophoresis  Followed by hematology         24 weeks gestation of pregnancy    Overview     --Labs at \[facility]"" 2023: Normal Pap,  GC and Chlamydia negative,   --Patient started on ASA 2 daily on October 3  --Normal anatomy scan on   -- ANEMIA: Hgb 10.1, MCV 88, ferritin 48, saw hematology 10/9, will start iron (nl Hgb electrophoresis)         Current Assessment & Plan     IUP at 24.2 weeks patient doing well with no concerns  Good fetal movement  Complaint of constipation, currently on Colace, will add MiraLAX    PLAN  50 g with CBC and RPR  Follow-up in 4 weeks         Relevant Orders    Glucose, 1 Hour Screen, Pregnancy    CBC Anemia Panel With Reflex, Pregnancy    Syphilis Screen with Reflex     Other Visit Diagnoses       Constipation during pregnancy in second trimester

## 2023-11-27 ENCOUNTER — LAB (OUTPATIENT)
Dept: LAB | Facility: LAB | Age: 28
End: 2023-11-27
Payer: MEDICAID

## 2023-11-27 DIAGNOSIS — Z3A.24 24 WEEKS GESTATION OF PREGNANCY (HHS-HCC): ICD-10-CM

## 2023-11-27 LAB
ERYTHROCYTE [DISTWIDTH] IN BLOOD BY AUTOMATED COUNT: 13.7 % (ref 11.5–14.5)
FERRITIN SERPL-MCNC: 37 NG/ML
FOLATE SERPL-MCNC: 20.3 NG/ML
GLUCOSE 1H P 50 G GLC PO SERPL-MCNC: 93 MG/DL
HCT VFR BLD AUTO: 34.2 % (ref 36–46)
HGB BLD-MCNC: 10.8 G/DL (ref 12–16)
IRON SATN MFR SERPL: 22 %
IRON SERPL-MCNC: 85 UG/DL
MCH RBC QN AUTO: 29.3 PG (ref 26–34)
MCHC RBC AUTO-ENTMCNC: 31.6 G/DL (ref 32–36)
MCV RBC AUTO: 93 FL (ref 80–100)
NRBC BLD-RTO: 0 /100 WBCS (ref 0–0)
PLATELET # BLD AUTO: 308 X10*3/UL (ref 150–450)
RBC # BLD AUTO: 3.69 X10*6/UL (ref 4–5.2)
REFLEX ADDED, ANEMIA PANEL: NORMAL
T PALLIDUM AB SER QL: NONREACTIVE
TIBC SERPL-MCNC: 394 UG/DL
UIBC SERPL-MCNC: 309 UG/DL
VIT B12 SERPL-MCNC: 222 PG/ML
WBC # BLD AUTO: 12 X10*3/UL (ref 4.4–11.3)

## 2023-11-27 PROCEDURE — 82607 VITAMIN B-12: CPT

## 2023-11-27 PROCEDURE — 82947 ASSAY GLUCOSE BLOOD QUANT: CPT

## 2023-11-27 PROCEDURE — 82746 ASSAY OF FOLIC ACID SERUM: CPT

## 2023-11-27 PROCEDURE — 83550 IRON BINDING TEST: CPT

## 2023-11-27 PROCEDURE — 36415 COLL VENOUS BLD VENIPUNCTURE: CPT

## 2023-11-27 PROCEDURE — 86780 TREPONEMA PALLIDUM: CPT

## 2023-11-27 PROCEDURE — 82728 ASSAY OF FERRITIN: CPT

## 2023-11-27 PROCEDURE — 85027 COMPLETE CBC AUTOMATED: CPT

## 2023-12-04 ENCOUNTER — APPOINTMENT (OUTPATIENT)
Dept: HEMATOLOGY/ONCOLOGY | Facility: CLINIC | Age: 28
End: 2023-12-04
Payer: MEDICAID

## 2023-12-06 NOTE — PROGRESS NOTES
"Patient ID: Briana Phelps is a 28 y.o. female.  Referring Physician: No referring provider defined for this encounter.  Primary Care Provider: No Assigned PCP Generic Provider, MD  Visit Type:  Follow Up     Verbal consent was requested and obtained from patient on this date for a telehealth visit.    Subjective    Telephone follow up for a 27 yo  woman with a PMH of anemia  and was referred to benign hematology for consultation of  gestational anemia. . Due date 2024. Recently hospitalized for abdominal pain. Remote history of anemia.      Denies abnormal weight loss, night sweats, fever. Denies fatigue, chills, sob, cp, n/v/d, n/t. No PICA. Denies any abnormal bleeding or bruising. No recurrent infections or lymphadenopathy. No joint/body pain. No known blood disorders in family. Has had surgery in past w/o issue. Never had blood/blood products. Denies NSAID use. Today complains of \"morning sickness\" and constipation. OB has prescribed a stool softener and medication for nausea that is helping relieve symptoms.      Noted that this patient has had a hemoglobin in the mid 10 range for the past few months. No other hematology labs noted.      Past Medical Hx: first pregnancy, remote history of iron deficiency anemia 2/2 to menorrhagia  Past Surgical Hx: Negative  Family Hx: No hemoglobinopathy or sickle cell disease, parents and siblings healthy: all live in [place].  Social Hx: no smoking, ETOH, or recreational drugs. Currently working full time    Review of Systems   Constitutional:  Positive for fatigue.   Gastrointestinal:  Positive for constipation and nausea.        Objective   BSA: There is no height or weight on file to calculate BSA.  LMP 2023 (Exact Date)      has a past medical history of Nausea/vomiting in pregnancy.   has no past surgical history on file.  No family history on file.  Oncology History    No history exists.       Briana Phelps  reports that she " has never smoked. She has never used smokeless tobacco.  She  reports that she does not currently use alcohol.  She  reports that she does not currently use drugs.      WBC   Date/Time Value Ref Range Status   11/27/2023 10:20 AM 12.0 (H) 4.4 - 11.3 x10*3/uL Final   11/15/2023 10:02 AM 9.7 4.4 - 11.3 x10*3/uL Final   10/09/2023 11:14 AM 7.8 4.4 - 11.3 x10*3/uL Final     nRBC   Date Value Ref Range Status   11/27/2023 0.0 0.0 - 0.0 /100 WBCs Final   11/15/2023 0.0 0.0 - 0.0 /100 WBCs Final     RBC   Date Value Ref Range Status   11/27/2023 3.69 (L) 4.00 - 5.20 x10*6/uL Final   11/15/2023 3.56 (L) 4.00 - 5.20 x10*6/uL Final   10/09/2023 3.56 (L) 4.00 - 5.20 x10*6/uL Final     Hemoglobin   Date Value Ref Range Status   11/27/2023 10.8 (L) 12.0 - 16.0 g/dL Final   11/15/2023 10.3 (L) 12.0 - 16.0 g/dL Final   10/09/2023 10.1 (L) 12.0 - 16.0 g/dL Final     Hematocrit   Date Value Ref Range Status   11/27/2023 34.2 (L) 36.0 - 46.0 % Final   11/15/2023 32.2 (L) 36.0 - 46.0 % Final   10/09/2023 31.6 (L) 36.0 - 46.0 % Final     MCV   Date/Time Value Ref Range Status   11/27/2023 10:20 AM 93 80 - 100 fL Final   11/15/2023 10:02 AM 90 80 - 100 fL Final   10/09/2023 11:14 AM 89 80 - 100 fL Final     MCH   Date/Time Value Ref Range Status   11/27/2023 10:20 AM 29.3 26.0 - 34.0 pg Final   11/15/2023 10:02 AM 28.9 26.0 - 34.0 pg Final   10/09/2023 11:14 AM 28.4 26.0 - 34.0 pg Final     MCHC   Date/Time Value Ref Range Status   11/27/2023 10:20 AM 31.6 (L) 32.0 - 36.0 g/dL Final   11/15/2023 10:02 AM 32.0 32.0 - 36.0 g/dL Final   10/09/2023 11:14 AM 32.0 32.0 - 36.0 g/dL Final     RDW   Date/Time Value Ref Range Status   11/27/2023 10:20 AM 13.7 11.5 - 14.5 % Final   11/15/2023 10:02 AM 13.6 11.5 - 14.5 % Final   10/09/2023 11:14 AM 13.9 11.5 - 14.5 % Final     Platelets   Date/Time Value Ref Range Status   11/27/2023 10:20  150 - 450 x10*3/uL Final   11/15/2023 10:02  150 - 450 x10*3/uL Final   10/09/2023 11:14   150 - 450 x10*3/uL Final     MPV   Date/Time Value Ref Range Status   10/09/2023 11:14 AM 8.3 7.5 - 11.5 fL Final     Neutrophils %   Date/Time Value Ref Range Status   11/15/2023 10:02 AM 73.9 40.0 - 80.0 % Final   10/09/2023 11:14 AM 74.9 40.0 - 80.0 % Final   09/28/2023 02:34 AM 67.5 40.0 - 80.0 % Final     Immature Granulocytes %, Automated   Date/Time Value Ref Range Status   11/15/2023 10:02 AM 1.3 (H) 0.0 - 0.9 % Final     Comment:     Immature Granulocyte Count (IG) includes promyelocytes, myelocytes and metamyelocytes but does not include bands. Percent differential counts (%) should be interpreted in the context of the absolute cell counts (cells/UL).   10/09/2023 11:14 AM 0.5 0.0 - 0.9 % Final     Comment:     Immature Granulocyte Count (IG) includes promyelocytes, myelocytes and metamyelocytes but does not include bands. Percent differential counts (%) should be interpreted in the context of the absolute cell counts (cells/UL).   09/28/2023 02:34 AM 0.5 0.0 - 0.9 % Final     Comment:      Immature Granulocyte Count (IG) includes promyelocytes,    myelocytes and metamyelocytes but does not include bands.   Percent differential counts (%) should be interpreted in the   context of the absolute cell counts (cells/L).       Lymphocytes %   Date/Time Value Ref Range Status   11/15/2023 10:02 AM 18.5 13.0 - 44.0 % Final   10/09/2023 11:14 AM 19.0 13.0 - 44.0 % Final   09/28/2023 02:34 AM 24.6 13.0 - 44.0 % Final     Monocytes %   Date/Time Value Ref Range Status   11/15/2023 10:02 AM 5.3 2.0 - 10.0 % Final   10/09/2023 11:14 AM 4.7 2.0 - 10.0 % Final   09/28/2023 02:34 AM 6.3 2.0 - 10.0 % Final     Eosinophils %   Date/Time Value Ref Range Status   11/15/2023 10:02 AM 0.7 0.0 - 6.0 % Final   10/09/2023 11:14 AM 0.6 0.0 - 6.0 % Final   09/28/2023 02:34 AM 0.9 0.0 - 6.0 % Final     Basophils %   Date/Time Value Ref Range Status   11/15/2023 10:02 AM 0.3 0.0 - 2.0 % Final   10/09/2023 11:14 AM 0.3 0.0 - 2.0 %  Final   2023 02:34 AM 0.2 0.0 - 2.0 % Final     Neutrophils Absolute   Date/Time Value Ref Range Status   11/15/2023 10:02 AM 7.12 1.20 - 7.70 x10*3/uL Final     Comment:     Percent differential counts (%) should be interpreted in the context of the absolute cell counts (cells/uL).   10/09/2023 11:14 AM 5.83 1.20 - 7.70 x10*3/uL Final     Comment:     Percent differential counts (%) should be interpreted in the context of the absolute cell counts (cells/uL).   2023 02:34 AM 6.80 1.20 - 7.70 x10E9/L Final     Immature Granulocytes Absolute, Automated   Date/Time Value Ref Range Status   11/15/2023 10:02 AM 0.13 0.00 - 0.70 x10*3/uL Final   10/09/2023 11:14 AM 0.04 0.00 - 0.70 x10*3/uL Final     Lymphocytes Absolute   Date/Time Value Ref Range Status   11/15/2023 10:02 AM 1.79 1.20 - 4.80 x10*3/uL Final   10/09/2023 11:14 AM 1.48 1.20 - 4.80 x10*3/uL Final   2023 02:34 AM 2.47 1.20 - 4.80 x10E9/L Final     Monocytes Absolute   Date/Time Value Ref Range Status   11/15/2023 10:02 AM 0.51 0.10 - 1.00 x10*3/uL Final   10/09/2023 11:14 AM 0.37 0.10 - 1.00 x10*3/uL Final   2023 02:34 AM 0.63 0.10 - 1.00 x10E9/L Final     Eosinophils Absolute   Date/Time Value Ref Range Status   11/15/2023 10:02 AM 0.07 0.00 - 0.70 x10*3/uL Final   10/09/2023 11:14 AM 0.05 0.00 - 0.70 x10*3/uL Final   2023 02:34 AM 0.09 0.00 - 0.70 x10E9/L Final     Basophils Absolute   Date/Time Value Ref Range Status   11/15/2023 10:02 AM 0.03 0.00 - 0.10 x10*3/uL Final   10/09/2023 11:14 AM 0.02 0.00 - 0.10 x10*3/uL Final   2023 02:34 AM 0.02 0.00 - 0.10 x10E9/L Final       Assessment/Plan   Assessment   27 yo  woman with a PMH of anemia  nd was referred to benign hematology for consultation of  gestational anemia. .  Due date 2024. Recently hospitalized for abdominal pain. Remote history of iron deficiency anemia. Recently moved to [place] from [place]. Patient is feeeling well with her first  pregnancy. No complaints noted today. Denies pica, bruising, bleeding MAHER or other symptoms of anemia. Endorses nausea and constipation which are being managed by OB.     Discussed possible etiologies including multifactorial, nutritional deficiencies, getational anemia, dilutional anemia, anemia of chronic disease, malabsorption, hemopathy, medications, bleeding, malignancy, etc. Patient taking po iron and other than constipation is tolerating it well. She has not had a chance to have labs drawn in the past month. We did discuss that hemoglobin electropheresis is normal. She was relieved to hear this.   We recommend checking monthly CBC, B12 and iron studies and we will see her back in February before she delivers.   The patient is in agreement with the following plan:    PLAN   1. Labs today: CBC/diff, CMP,  iron studies, B12, folate,   2. We will schedule a follow up visit in February.    3. Plan for monthly CBC diff and iron studies, patient should continue with prenatal vitamins and we will notify her if she needs additional supplementation.      I had an extensive discussion with the patient regarding the diagnosis and discussed the plan of therapy, including general considerations regarding side effects and outcomes. Pt understood and gave appropriate teach back about the plan of care. All questions were answered to the patient's satisfaction. The patient is instructed to contact us at any time if questions or problems arise. Thank you for the opportunity to participate in the care of this very pleasant patient.      Rosanne M Casal, MEME-CNP, DNP

## 2023-12-06 NOTE — PROGRESS NOTES
"Patient ID: Briana Phelps is a 28 y.o. female.  Referring Physician: No referring provider defined for this encounter.  Primary Care Provider: No Assigned PCP Generic Provider, MD  Visit Type:  Follow Up      Verbal consent was requested and obtained from patient on this date for a telehealth visit.        Subjective   Telephone follow up for a 27 yo  woman with a PMH of anemia  and was referred to benign hematology for consultation of  gestational anemia. . Due date 2024. Recently hospitalized for abdominal pain. Remote history of anemia.      Denies abnormal weight loss, night sweats, fever. Denies fatigue, chills, sob, cp, n/v/d, n/t. No PICA. Denies any abnormal bleeding or bruising. No recurrent infections or lymphadenopathy. No joint/body pain. No known blood disorders in family. Has had surgery in past w/o issue. Never had blood/blood products. Denies NSAID use. Today complains of \"morning sickness\" and constipation. OB has prescribed a stool softener and medication for nausea that is helping relieve symptoms.      Noted that this patient has had a hemoglobin in the mid 10 range for the past few months. No other hematology labs noted.      Past Medical Hx: first pregnancy, remote history of iron deficiency anemia 2/2 to menorrhagia  Past Surgical Hx: Negative  Family Hx: No hemoglobinopathy or sickle cell disease, parents and siblings healthy: all live in [place].  Social Hx: no smoking, ETOH, or recreational drugs. Currently working full time     Review of Systems   Constitutional:  Positive for fatigue.   Gastrointestinal:  Positive for constipation and nausea.               Objective   BSA: There is no height or weight on file to calculate BSA.  LMP 2023 (Exact Date)       has a past medical history of Nausea/vomiting in pregnancy.   has no past surgical history on file.  Family History   No family history on file.     Patient's Oncology History documentation     " "  Oncology History     No history exists.            Briana Phelps  reports that she has never smoked. She has never used smokeless tobacco.  She  reports that she does not currently use alcohol.  She  reports that she does not currently use drugs.              WBC   Date/Time Value Ref Range Status   10/09/2023 11:14 AM 7.8 4.4 - 11.3 x10*3/uL Final   09/28/2023 02:34 AM 10.1 4.4 - 11.3 x10E9/L Final   09/19/2023 01:43 AM 8.3 4.4 - 11.3 x10E9/L Final      No results found for: \"NRBC\"        RBC   Date Value Ref Range Status   10/09/2023 3.56 (L) 4.00 - 5.20 x10*6/uL Final   09/28/2023 3.55 (L) 4.00 - 5.20 x10E12/L Final   09/19/2023 3.72 (L) 4.00 - 5.20 x10E12/L Final            Hemoglobin   Date Value Ref Range Status   10/09/2023 10.1 (L) 12.0 - 16.0 g/dL Final   09/28/2023 10.1 (L) 12.0 - 16.0 g/dL Final   09/19/2023 10.5 (L) 12.0 - 16.0 g/dL Final            Hematocrit   Date Value Ref Range Status   10/09/2023 31.6 (L) 36.0 - 46.0 % Final   09/28/2023 31.2 (L) 36.0 - 46.0 % Final   09/19/2023 33.0 (L) 36.0 - 46.0 % Final            MCV   Date/Time Value Ref Range Status   10/09/2023 11:14 AM 89 80 - 100 fL Final   09/28/2023 02:34 AM 88 80 - 100 fL Final   09/19/2023 01:43 AM 89 80 - 100 fL Final            MCH   Date/Time Value Ref Range Status   10/09/2023 11:14 AM 28.4 26.0 - 34.0 pg Final            MCHC   Date/Time Value Ref Range Status   10/09/2023 11:14 AM 32.0 32.0 - 36.0 g/dL Final   09/28/2023 02:34 AM 32.4 32.0 - 36.0 g/dL Final   09/19/2023 01:43 AM 31.8 (L) 32.0 - 36.0 g/dL Final            RDW   Date/Time Value Ref Range Status   10/09/2023 11:14 AM 13.9 11.5 - 14.5 % Final   09/28/2023 02:34 AM 14.0 11.5 - 14.5 % Final   09/19/2023 01:43 AM 14.1 11.5 - 14.5 % Final            Platelets   Date/Time Value Ref Range Status   10/09/2023 11:14  150 - 450 x10*3/uL Final   09/28/2023 02:34  150 - 450 x10E9/L Final   09/19/2023 01:43  150 - 450 x10E9/L Final            MPV "   Date/Time Value Ref Range Status   10/09/2023 11:14 AM 8.3 7.5 - 11.5 fL Final            Neutrophils %   Date/Time Value Ref Range Status   10/09/2023 11:14 AM 74.9 40.0 - 80.0 % Final   09/28/2023 02:34 AM 67.5 40.0 - 80.0 % Final              Immature Granulocytes %, Automated   Date/Time Value Ref Range Status   10/09/2023 11:14 AM 0.5 0.0 - 0.9 % Final       Comment:       Immature Granulocyte Count (IG) includes promyelocytes, myelocytes and metamyelocytes but does not include bands. Percent differential counts (%) should be interpreted in the context of the absolute cell counts (cells/UL).   09/28/2023 02:34 AM 0.5 0.0 - 0.9 % Final       Comment:        Immature Granulocyte Count (IG) includes promyelocytes,    myelocytes and metamyelocytes but does not include bands.   Percent differential counts (%) should be interpreted in the   context of the absolute cell counts (cells/L).               Lymphocytes %   Date/Time Value Ref Range Status   10/09/2023 11:14 AM 19.0 13.0 - 44.0 % Final   09/28/2023 02:34 AM 24.6 13.0 - 44.0 % Final            Monocytes %   Date/Time Value Ref Range Status   10/09/2023 11:14 AM 4.7 2.0 - 10.0 % Final   09/28/2023 02:34 AM 6.3 2.0 - 10.0 % Final            Eosinophils %   Date/Time Value Ref Range Status   10/09/2023 11:14 AM 0.6 0.0 - 6.0 % Final   09/28/2023 02:34 AM 0.9 0.0 - 6.0 % Final            Basophils %   Date/Time Value Ref Range Status   10/09/2023 11:14 AM 0.3 0.0 - 2.0 % Final   09/28/2023 02:34 AM 0.2 0.0 - 2.0 % Final              Neutrophils Absolute   Date/Time Value Ref Range Status   10/09/2023 11:14 AM 5.83 1.20 - 7.70 x10*3/uL Final       Comment:       Percent differential counts (%) should be interpreted in the context of the absolute cell counts (cells/uL).   09/28/2023 02:34 AM 6.80 1.20 - 7.70 x10E9/L Final            Immature Granulocytes Absolute, Automated   Date/Time Value Ref Range Status   10/09/2023 11:14 AM 0.04 0.00 - 0.70 x10*3/uL Final             Lymphocytes Absolute   Date/Time Value Ref Range Status   10/09/2023 11:14 AM 1.48 1.20 - 4.80 x10*3/uL Final   2023 02:34 AM 2.47 1.20 - 4.80 x10E9/L Final            Monocytes Absolute   Date/Time Value Ref Range Status   10/09/2023 11:14 AM 0.37 0.10 - 1.00 x10*3/uL Final   2023 02:34 AM 0.63 0.10 - 1.00 x10E9/L Final            Eosinophils Absolute   Date/Time Value Ref Range Status   10/09/2023 11:14 AM 0.05 0.00 - 0.70 x10*3/uL Final   2023 02:34 AM 0.09 0.00 - 0.70 x10E9/L Final            Basophils Absolute   Date/Time Value Ref Range Status   10/09/2023 11:14 AM 0.02 0.00 - 0.10 x10*3/uL Final   2023 02:34 AM 0.02 0.00 - 0.10 x10E9/L Final               Assessment/Plan   Assessment   27 yo  woman with a PMH of anemia  nd was referred to benign hematology for consultation of  gestational anemia. .  Due date 2024. Recently hospitalized for abdominal pain. Remote history of iron deficiency anemia. [place] from [facility]. Patient is feeeling well with her first pregnancy. No complaints noted today. Denies pica, bruising, bleeding MAHER or other symptoms of anemia. Endorses nausea and constipation which are being managed by OB.     Discussed possible etiologies including multifactorial, nutritional deficiencies, getational anemia, dilutional anemia, anemia of chronic disease, malabsorption, hemopathy, medications, bleeding, malignancy, etc. Patient taking po iron and other than constipation is tolerating it well. She has not had a chance to have labs drawn in the past month. We did discuss that hemoglobin electropheresis is normal. She was relieved to hear this.   We recommend checking monthly CBC, B12 and iron studies and we will see her back in February before she delivers.   The patient is in agreement with the following plan:     PLAN   1. Labs today: CBC/diff, CMP,  iron studies, B12, folate,   2. We will schedule a follow up visit in  February.    3. Plan for monthly CBC diff and iron studies, patient should continue with prenatal vitamins and we will notify her if she needs additional supplementation.      I had an extensive discussion with the patient regarding the diagnosis and discussed the plan of therapy, including general considerations regarding side effects and outcomes. Pt understood and gave appropriate teach back about the plan of care. All questions were answered to the patient's satisfaction. The patient is instructed to contact us at any time if questions or problems arise. Thank you for the opportunity to participate in the care of this very pleasant patient.        Rosanne M Casal, APRN-CNP, DNP

## 2023-12-18 PROBLEM — Z3A.27 27 WEEKS GESTATION OF PREGNANCY (HHS-HCC): Status: ACTIVE | Noted: 2023-10-03

## 2023-12-19 ENCOUNTER — APPOINTMENT (OUTPATIENT)
Dept: OBSTETRICS AND GYNECOLOGY | Facility: CLINIC | Age: 28
End: 2023-12-19
Payer: MEDICAID

## 2023-12-22 ENCOUNTER — APPOINTMENT (OUTPATIENT)
Dept: OBSTETRICS AND GYNECOLOGY | Facility: CLINIC | Age: 28
End: 2023-12-22
Payer: MEDICAID

## 2024-01-13 PROBLEM — Z3A.32 32 WEEKS GESTATION OF PREGNANCY (HHS-HCC): Status: ACTIVE | Noted: 2023-10-03

## 2024-01-13 PROBLEM — Z34.03 FIRST PREGNANCY, THIRD TRIMESTER (HHS-HCC): Status: ACTIVE | Noted: 2023-10-03

## 2024-01-13 PROBLEM — O99.013 ANEMIA AFFECTING PREGNANCY IN THIRD TRIMESTER (HHS-HCC): Status: ACTIVE | Noted: 2023-10-03

## 2024-01-16 ENCOUNTER — ROUTINE PRENATAL (OUTPATIENT)
Dept: OBSTETRICS AND GYNECOLOGY | Facility: CLINIC | Age: 29
End: 2024-01-16
Payer: MEDICAID

## 2024-01-16 VITALS — BODY MASS INDEX: 28.91 KG/M2 | DIASTOLIC BLOOD PRESSURE: 78 MMHG | WEIGHT: 181.8 LBS | SYSTOLIC BLOOD PRESSURE: 116 MMHG

## 2024-01-16 DIAGNOSIS — Z23 ENCOUNTER FOR IMMUNIZATION: ICD-10-CM

## 2024-01-16 DIAGNOSIS — Z3A.32 32 WEEKS GESTATION OF PREGNANCY (HHS-HCC): ICD-10-CM

## 2024-01-16 DIAGNOSIS — O99.012 ANEMIA DURING PREGNANCY IN SECOND TRIMESTER (HHS-HCC): ICD-10-CM

## 2024-01-16 DIAGNOSIS — Z34.03 FIRST PREGNANCY, THIRD TRIMESTER (HHS-HCC): Primary | ICD-10-CM

## 2024-01-16 LAB
ERYTHROCYTE [DISTWIDTH] IN BLOOD BY AUTOMATED COUNT: 13.8 % (ref 11.5–14.5)
HCT VFR BLD AUTO: 32.6 % (ref 36–46)
HGB BLD-MCNC: 10.6 G/DL (ref 12–16)
MCH RBC QN AUTO: 29.3 PG (ref 26–34)
MCHC RBC AUTO-ENTMCNC: 32.5 G/DL (ref 32–36)
MCV RBC AUTO: 90 FL (ref 80–100)
NRBC BLD-RTO: 0 /100 WBCS (ref 0–0)
PLATELET # BLD AUTO: 269 X10*3/UL (ref 150–450)
RBC # BLD AUTO: 3.62 X10*6/UL (ref 4–5.2)
WBC # BLD AUTO: 12.7 X10*3/UL (ref 4.4–11.3)

## 2024-01-16 PROCEDURE — 90715 TDAP VACCINE 7 YRS/> IM: CPT | Performed by: OBSTETRICS & GYNECOLOGY

## 2024-01-16 PROCEDURE — 82746 ASSAY OF FOLIC ACID SERUM: CPT

## 2024-01-16 PROCEDURE — 82728 ASSAY OF FERRITIN: CPT

## 2024-01-16 PROCEDURE — 85027 COMPLETE CBC AUTOMATED: CPT

## 2024-01-16 PROCEDURE — 83550 IRON BINDING TEST: CPT

## 2024-01-16 PROCEDURE — 90471 IMMUNIZATION ADMIN: CPT | Performed by: OBSTETRICS & GYNECOLOGY

## 2024-01-16 PROCEDURE — 99213 OFFICE O/P EST LOW 20 MIN: CPT | Performed by: OBSTETRICS & GYNECOLOGY

## 2024-01-16 PROCEDURE — 82607 VITAMIN B-12: CPT

## 2024-01-16 PROCEDURE — 36415 COLL VENOUS BLD VENIPUNCTURE: CPT

## 2024-01-16 RX ORDER — DOCUSATE SODIUM 100 MG/1
CAPSULE, LIQUID FILLED ORAL
Qty: 60 CAPSULE | Refills: 5 | Status: SHIPPED | OUTPATIENT
Start: 2024-01-16

## 2024-01-16 NOTE — PROGRESS NOTES
Subjective     Briana Phelps is a 28 y.o.  at 32w2d with a working estimated date of delivery of 3/10/2024, by Last Menstrual Period who presents for a routine prenatal visit.     Patient with good fetal movement.  Complains of moderate nausea, will try wrist bands.  Patient also with fatigue, she is taking her iron, will repeat CBC.    Objective   Physical Exam  Weight: 82.5 kg (181 lb 12.8 oz)  Expected Total Weight Gain: 7 kg (15 lb)-11.5 kg (25 lb)   Pregravid BMI: 26.24  BP: 116/78  Fetal Heart Rate: 150 Fundal Height (cm): 33 cm    Prenatal Labs  Urine dip:  Lab Results   Component Value Date    KETONESU Negative 2023       Lab Results   Component Value Date    HGB 10.8 (L) 2023    HCT 34.2 (L) 2023    ABO B 2023    HEPBSAG NONREACTIVE 2023             Problem List Items Addressed This Visit       First pregnancy, third trimester - Primary    32 weeks gestation of pregnancy    Overview     --Labs at [facility] 2023: Normal Pap,  GC and Chlamydia negative,   --Patient started on ASA 2 daily on October 3  --Normal anatomy scan on , will repeat ultrasound at 36 weeks for growth  -- ANEMIA: Hgb 10.1, MCV 88, ferritin 48, saw hematology 10/9, will start iron (nl Hgb electrophoresis), repeat CBC today  -- Normal 50 g  --NAUSEA: Will try wrist bands did discuss option of Zofran         Current Assessment & Plan     IUP at 32.2 weeks  Good fetal movement.  Patient with complaint of fatigue, will repeat CBC, patient is on iron  Complaint of nausea, patient desires to try wrist bands  Will repeat ultrasound in 1 month for growth.    PLAN: Follow-up in 2 weeks         Relevant Orders    CBC Anemia Panel With Reflex, Pregnancy    US fetal biophysical profile wo non stress testing     Other Visit Diagnoses       Encounter for immunization        Relevant Orders    Tdap vaccine, age 7 years and older  (BOOSTRIX)

## 2024-01-16 NOTE — ASSESSMENT & PLAN NOTE
IUP at 32.2 weeks  Good fetal movement.  Patient with complaint of fatigue, will repeat CBC, patient is on iron  Complaint of nausea, patient desires to try wrist bands  Will repeat ultrasound in 1 month for growth.    PLAN: Follow-up in 2 weeks

## 2024-01-17 LAB
FERRITIN SERPL-MCNC: 26 NG/ML
FOLATE SERPL-MCNC: 20.7 NG/ML
IRON SATN MFR SERPL: 14 %
IRON SERPL-MCNC: 57 UG/DL
REFLEX ADDED, ANEMIA PANEL: NORMAL
TIBC SERPL-MCNC: 419 UG/DL
UIBC SERPL-MCNC: 362 UG/DL
VIT B12 SERPL-MCNC: 185 PG/ML

## 2024-01-29 PROBLEM — Z3A.34 34 WEEKS GESTATION OF PREGNANCY (HHS-HCC): Status: ACTIVE | Noted: 2023-10-03

## 2024-01-30 ENCOUNTER — ROUTINE PRENATAL (OUTPATIENT)
Dept: OBSTETRICS AND GYNECOLOGY | Facility: CLINIC | Age: 29
End: 2024-01-30
Payer: MEDICAID

## 2024-01-30 VITALS — BODY MASS INDEX: 29.73 KG/M2 | WEIGHT: 187 LBS

## 2024-01-30 DIAGNOSIS — Z3A.34 34 WEEKS GESTATION OF PREGNANCY (HHS-HCC): ICD-10-CM

## 2024-01-30 DIAGNOSIS — O99.013 ANEMIA AFFECTING PREGNANCY IN THIRD TRIMESTER (HHS-HCC): Primary | ICD-10-CM

## 2024-01-30 PROCEDURE — 99213 OFFICE O/P EST LOW 20 MIN: CPT | Performed by: OBSTETRICS & GYNECOLOGY

## 2024-01-30 NOTE — ASSESSMENT & PLAN NOTE
IUP at 34.2 weeks  Good fetal movement.  Patient unable to tolerate her iron, has hematology appointment on February 5    PLAN:  Follow-up in 2 weeks

## 2024-01-30 NOTE — PROGRESS NOTES
Subjective     Briana Phelps is a 28 y.o.  at 34w2d with a working estimated date of delivery of 3/10/2024, by Last Menstrual Period who presents for a routine prenatal visit.     Patient with good fetal movement, doing well with no concerns.    Objective   Physical Exam  Weight: 84.8 kg (187 lb)  Expected Total Weight Gain: 7 kg (15 lb)-11.5 kg (25 lb)   Pregravid BMI: 26.24     Fetal Heart Rate: 160 Fundal Height (cm): 34 cm    Prenatal Labs  Urine dip:  Lab Results   Component Value Date    KETONESU Negative 2023       Lab Results   Component Value Date    HGB 10.6 (L) 2024    HCT 32.6 (L) 2024    ABO B 2023    HEPBSAG NONREACTIVE 2023             Problem List Items Addressed This Visit       Anemia affecting pregnancy in third trimester - Primary    Overview     Hgb 10.1 with MCV of 88 and ferritin 48  Patient denies sickle cell  Normal hemoglobin electrophoresis  Followed by hematology         34 weeks gestation of pregnancy    Overview     --Labs at [facility] 2023: Normal Pap,  GC and Chlamydia negative,   --Patient started on ASA 2 daily on October 3  --Normal anatomy scan on , will repeat ultrasound at 36 weeks for growth, on   -- ANEMIA: Hgb 10.1, MCV 88, ferritin 48, saw hematology 10/9, will start iron (nl Hgb electrophoresis);  Hgb 10.6, patient unable to tolerate iron, did have ferritin of 48.  Will observe, has hematology appointment   -- Normal 50 g  --NAUSEA: Will try wrist bands did discuss option of Zofran         Current Assessment & Plan     IUP at 34.2 weeks  Good fetal movement.  Patient unable to tolerate her iron, has hematology appointment on     PLAN:  Follow-up in 2 weeks

## 2024-02-02 DIAGNOSIS — O99.013 ANEMIA AFFECTING PREGNANCY IN THIRD TRIMESTER (HHS-HCC): Primary | ICD-10-CM

## 2024-02-05 ENCOUNTER — APPOINTMENT (OUTPATIENT)
Dept: HEMATOLOGY/ONCOLOGY | Facility: CLINIC | Age: 29
End: 2024-02-05
Payer: MEDICAID

## 2024-02-05 ENCOUNTER — LAB (OUTPATIENT)
Dept: LAB | Facility: HOSPITAL | Age: 29
End: 2024-02-05
Payer: MEDICAID

## 2024-02-05 ENCOUNTER — APPOINTMENT (OUTPATIENT)
Dept: LAB | Facility: HOSPITAL | Age: 29
End: 2024-02-05
Payer: MEDICAID

## 2024-02-05 DIAGNOSIS — O99.013 ANEMIA AFFECTING PREGNANCY IN THIRD TRIMESTER (HHS-HCC): ICD-10-CM

## 2024-02-05 LAB
BASOPHILS # BLD AUTO: 0.02 X10*3/UL (ref 0–0.1)
BASOPHILS NFR BLD AUTO: 0.2 %
EOSINOPHIL # BLD AUTO: 0.06 X10*3/UL (ref 0–0.7)
EOSINOPHIL NFR BLD AUTO: 0.5 %
ERYTHROCYTE [DISTWIDTH] IN BLOOD BY AUTOMATED COUNT: 14.1 % (ref 11.5–14.5)
FOLATE SERPL-MCNC: >22.3 NG/ML
HCT VFR BLD AUTO: 32.2 % (ref 36–46)
HGB BLD-MCNC: 10.4 G/DL (ref 12–16)
IMM GRANULOCYTES # BLD AUTO: 0.35 X10*3/UL (ref 0–0.7)
IMM GRANULOCYTES NFR BLD AUTO: 3.1 % (ref 0–0.9)
LYMPHOCYTES # BLD AUTO: 1.68 X10*3/UL (ref 1.2–4.8)
LYMPHOCYTES NFR BLD AUTO: 15.1 %
MCH RBC QN AUTO: 28.7 PG (ref 26–34)
MCHC RBC AUTO-ENTMCNC: 32.3 G/DL (ref 32–36)
MCV RBC AUTO: 89 FL (ref 80–100)
MONOCYTES # BLD AUTO: 0.64 X10*3/UL (ref 0.1–1)
MONOCYTES NFR BLD AUTO: 5.7 %
NEUTROPHILS # BLD AUTO: 8.4 X10*3/UL (ref 1.2–7.7)
NEUTROPHILS NFR BLD AUTO: 75.4 %
PLATELET # BLD AUTO: 257 X10*3/UL (ref 150–450)
RBC # BLD AUTO: 3.62 X10*6/UL (ref 4–5.2)
VIT B12 SERPL-MCNC: 170 PG/ML (ref 211–911)
WBC # BLD AUTO: 11.2 X10*3/UL (ref 4.4–11.3)

## 2024-02-05 PROCEDURE — 83540 ASSAY OF IRON: CPT

## 2024-02-05 PROCEDURE — 82607 VITAMIN B-12: CPT

## 2024-02-05 PROCEDURE — 36415 COLL VENOUS BLD VENIPUNCTURE: CPT

## 2024-02-05 PROCEDURE — 85025 COMPLETE CBC W/AUTO DIFF WBC: CPT

## 2024-02-05 PROCEDURE — 80053 COMPREHEN METABOLIC PANEL: CPT

## 2024-02-05 PROCEDURE — 82728 ASSAY OF FERRITIN: CPT

## 2024-02-05 PROCEDURE — 82746 ASSAY OF FOLIC ACID SERUM: CPT

## 2024-02-06 ENCOUNTER — TELEMEDICINE (OUTPATIENT)
Dept: HEMATOLOGY/ONCOLOGY | Facility: HOSPITAL | Age: 29
End: 2024-02-06
Payer: MEDICAID

## 2024-02-06 ENCOUNTER — APPOINTMENT (OUTPATIENT)
Dept: LAB | Facility: HOSPITAL | Age: 29
End: 2024-02-06
Payer: MEDICAID

## 2024-02-06 DIAGNOSIS — E53.8 B12 DEFICIENCY: ICD-10-CM

## 2024-02-06 DIAGNOSIS — O99.013 ANEMIA AFFECTING PREGNANCY IN THIRD TRIMESTER (HHS-HCC): Primary | ICD-10-CM

## 2024-02-06 LAB
ALBUMIN SERPL BCP-MCNC: 3.7 G/DL (ref 3.4–5)
ALP SERPL-CCNC: 105 U/L (ref 33–110)
ALT SERPL W P-5'-P-CCNC: 10 U/L (ref 7–45)
ANION GAP SERPL CALC-SCNC: 13 MMOL/L (ref 10–20)
AST SERPL W P-5'-P-CCNC: 12 U/L (ref 9–39)
BILIRUB SERPL-MCNC: 0.4 MG/DL (ref 0–1.2)
BUN SERPL-MCNC: 6 MG/DL (ref 6–23)
CALCIUM SERPL-MCNC: 8.8 MG/DL (ref 8.6–10.3)
CHLORIDE SERPL-SCNC: 106 MMOL/L (ref 98–107)
CO2 SERPL-SCNC: 21 MMOL/L (ref 21–32)
CREAT SERPL-MCNC: 0.6 MG/DL (ref 0.5–1.05)
EGFRCR SERPLBLD CKD-EPI 2021: >90 ML/MIN/1.73M*2
FERRITIN SERPL-MCNC: 26 NG/ML (ref 8–150)
GLUCOSE SERPL-MCNC: 100 MG/DL (ref 74–99)
IRON SATN MFR SERPL: 26 % (ref 25–45)
IRON SERPL-MCNC: 108 UG/DL (ref 35–150)
POTASSIUM SERPL-SCNC: 4 MMOL/L (ref 3.5–5.3)
PROT SERPL-MCNC: 6.6 G/DL (ref 6.4–8.2)
SODIUM SERPL-SCNC: 136 MMOL/L (ref 136–145)
TIBC SERPL-MCNC: 410 UG/DL (ref 240–445)
UIBC SERPL-MCNC: 302 UG/DL (ref 110–370)

## 2024-02-06 PROCEDURE — 99213 OFFICE O/P EST LOW 20 MIN: CPT | Performed by: NURSE PRACTITIONER

## 2024-02-06 PROCEDURE — 1036F TOBACCO NON-USER: CPT | Performed by: NURSE PRACTITIONER

## 2024-02-06 RX ORDER — FAMOTIDINE 10 MG/ML
20 INJECTION INTRAVENOUS ONCE AS NEEDED
Status: CANCELLED | OUTPATIENT
Start: 2024-02-16

## 2024-02-06 RX ORDER — CYANOCOBALAMIN 1000 UG/ML
1000 INJECTION, SOLUTION INTRAMUSCULAR; SUBCUTANEOUS ONCE
Status: CANCELLED | OUTPATIENT
Start: 2024-02-16

## 2024-02-06 RX ORDER — EPINEPHRINE 0.3 MG/.3ML
0.3 INJECTION SUBCUTANEOUS EVERY 5 MIN PRN
Status: CANCELLED | OUTPATIENT
Start: 2024-02-16

## 2024-02-06 RX ORDER — ALBUTEROL SULFATE 0.83 MG/ML
3 SOLUTION RESPIRATORY (INHALATION) AS NEEDED
Status: CANCELLED | OUTPATIENT
Start: 2024-02-16

## 2024-02-06 RX ORDER — DIPHENHYDRAMINE HYDROCHLORIDE 50 MG/ML
50 INJECTION INTRAMUSCULAR; INTRAVENOUS AS NEEDED
Status: CANCELLED | OUTPATIENT
Start: 2024-02-16

## 2024-02-06 NOTE — PROGRESS NOTES
"Patient ID: Briana Phelps is a 28 y.o. female.  Referring Physician: Rosanne M Casal, APRN-CNP, DNP  [address] [place] [address]  Primary Care Provider: No Assigned PCP Generic Provider, MD  Visit Type:  Follow Up     Verbal consent was requested and obtained from patient on this date for a telehealth visit.    Subjective    Subjective   Telephone follow up for a 29 yo  woman with a PMH of anemia  and was referred to benign hematology for consultation of  gestational anemia. . Due date 2024. Recently hospitalized for abdominal pain. Remote history of anemia.      Denies abnormal weight loss, night sweats, fever. Denies fatigue, chills, sob, cp, n/v/d, n/t. No PICA. Denies any abnormal bleeding or bruising. No recurrent infections or lymphadenopathy. No joint/body pain. No known blood disorders in family. Has had surgery in past w/o issue. Never had blood/blood products. Denies NSAID use. Today complains of \"morning sickness\" and constipation. OB has prescribed a stool softener and medication for nausea that is helping relieve symptoms.      Noted that this patient has had a hemoglobin in the mid 10 range for the past few months. No other hematology labs noted.      Past Medical Hx: first pregnancy, remote history of iron deficiency anemia 2/2 to menorrhagia  Past Surgical Hx: Negative  Family Hx: No hemoglobinopathy or sickle cell disease, parents and siblings healthy: all live in [place].  Social Hx: no smoking, ETOH, or recreational drugs. Currently working full time     Review of Systems   Constitutional:  Positive for fatigue.   Gastrointestinal:  Positive for constipation and nausea.     Objective   BSA: There is no height or weight on file to calculate BSA.  LMP 2023 (Exact Date)      has a past medical history of Nausea/vomiting in pregnancy.   has no past surgical history on file.  No family history on file.  Oncology History    No history exists. "       Briana Phelps  reports that she has never smoked. She has never used smokeless tobacco.  She  reports that she does not currently use alcohol.  She  reports that she does not currently use drugs.    Physical Exam    WBC   Date/Time Value Ref Range Status   02/05/2024 01:11 PM 11.2 4.4 - 11.3 x10*3/uL Final   01/16/2024 04:14 PM 12.7 (H) 4.4 - 11.3 x10*3/uL Final   11/27/2023 10:20 AM 12.0 (H) 4.4 - 11.3 x10*3/uL Final     nRBC   Date Value Ref Range Status   01/16/2024 0.0 0.0 - 0.0 /100 WBCs Final   11/27/2023 0.0 0.0 - 0.0 /100 WBCs Final   11/15/2023 0.0 0.0 - 0.0 /100 WBCs Final     RBC   Date Value Ref Range Status   02/05/2024 3.62 (L) 4.00 - 5.20 x10*6/uL Final   01/16/2024 3.62 (L) 4.00 - 5.20 x10*6/uL Final   11/27/2023 3.69 (L) 4.00 - 5.20 x10*6/uL Final     Hemoglobin   Date Value Ref Range Status   02/05/2024 10.4 (L) 12.0 - 16.0 g/dL Final   01/16/2024 10.6 (L) 12.0 - 16.0 g/dL Final   11/27/2023 10.8 (L) 12.0 - 16.0 g/dL Final     Hematocrit   Date Value Ref Range Status   02/05/2024 32.2 (L) 36.0 - 46.0 % Final   01/16/2024 32.6 (L) 36.0 - 46.0 % Final   11/27/2023 34.2 (L) 36.0 - 46.0 % Final     MCV   Date/Time Value Ref Range Status   02/05/2024 01:11 PM 89 80 - 100 fL Final   01/16/2024 04:14 PM 90 80 - 100 fL Final   11/27/2023 10:20 AM 93 80 - 100 fL Final     MCH   Date/Time Value Ref Range Status   02/05/2024 01:11 PM 28.7 26.0 - 34.0 pg Final   01/16/2024 04:14 PM 29.3 26.0 - 34.0 pg Final   11/27/2023 10:20 AM 29.3 26.0 - 34.0 pg Final     MCHC   Date/Time Value Ref Range Status   02/05/2024 01:11 PM 32.3 32.0 - 36.0 g/dL Final   01/16/2024 04:14 PM 32.5 32.0 - 36.0 g/dL Final   11/27/2023 10:20 AM 31.6 (L) 32.0 - 36.0 g/dL Final     RDW   Date/Time Value Ref Range Status   02/05/2024 01:11 PM 14.1 11.5 - 14.5 % Final   01/16/2024 04:14 PM 13.8 11.5 - 14.5 % Final   11/27/2023 10:20 AM 13.7 11.5 - 14.5 % Final     Platelets   Date/Time Value Ref Range Status   02/05/2024 01:11 PM  257 150 - 450 x10*3/uL Final   01/16/2024 04:14  150 - 450 x10*3/uL Final   11/27/2023 10:20  150 - 450 x10*3/uL Final     MPV   Date/Time Value Ref Range Status   10/09/2023 11:14 AM 8.3 7.5 - 11.5 fL Final     Neutrophils %   Date/Time Value Ref Range Status   02/05/2024 01:11 PM 75.4 40.0 - 80.0 % Final   11/15/2023 10:02 AM 73.9 40.0 - 80.0 % Final   10/09/2023 11:14 AM 74.9 40.0 - 80.0 % Final     Immature Granulocytes %, Automated   Date/Time Value Ref Range Status   02/05/2024 01:11 PM 3.1 (H) 0.0 - 0.9 % Final     Comment:     Immature Granulocyte Count (IG) includes promyelocytes, myelocytes and metamyelocytes but does not include bands. Percent differential counts (%) should be interpreted in the context of the absolute cell counts (cells/UL).   11/15/2023 10:02 AM 1.3 (H) 0.0 - 0.9 % Final     Comment:     Immature Granulocyte Count (IG) includes promyelocytes, myelocytes and metamyelocytes but does not include bands. Percent differential counts (%) should be interpreted in the context of the absolute cell counts (cells/UL).   10/09/2023 11:14 AM 0.5 0.0 - 0.9 % Final     Comment:     Immature Granulocyte Count (IG) includes promyelocytes, myelocytes and metamyelocytes but does not include bands. Percent differential counts (%) should be interpreted in the context of the absolute cell counts (cells/UL).     Lymphocytes %   Date/Time Value Ref Range Status   02/05/2024 01:11 PM 15.1 13.0 - 44.0 % Final   11/15/2023 10:02 AM 18.5 13.0 - 44.0 % Final   10/09/2023 11:14 AM 19.0 13.0 - 44.0 % Final     Monocytes %   Date/Time Value Ref Range Status   02/05/2024 01:11 PM 5.7 2.0 - 10.0 % Final   11/15/2023 10:02 AM 5.3 2.0 - 10.0 % Final   10/09/2023 11:14 AM 4.7 2.0 - 10.0 % Final     Eosinophils %   Date/Time Value Ref Range Status   02/05/2024 01:11 PM 0.5 0.0 - 6.0 % Final   11/15/2023 10:02 AM 0.7 0.0 - 6.0 % Final   10/09/2023 11:14 AM 0.6 0.0 - 6.0 % Final     Basophils %   Date/Time Value  Ref Range Status   02/05/2024 01:11 PM 0.2 0.0 - 2.0 % Final   11/15/2023 10:02 AM 0.3 0.0 - 2.0 % Final   10/09/2023 11:14 AM 0.3 0.0 - 2.0 % Final     Neutrophils Absolute   Date/Time Value Ref Range Status   02/05/2024 01:11 PM 8.40 (H) 1.20 - 7.70 x10*3/uL Final     Comment:     Percent differential counts (%) should be interpreted in the context of the absolute cell counts (cells/uL).   11/15/2023 10:02 AM 7.12 1.20 - 7.70 x10*3/uL Final     Comment:     Percent differential counts (%) should be interpreted in the context of the absolute cell counts (cells/uL).   10/09/2023 11:14 AM 5.83 1.20 - 7.70 x10*3/uL Final     Comment:     Percent differential counts (%) should be interpreted in the context of the absolute cell counts (cells/uL).     Immature Granulocytes Absolute, Automated   Date/Time Value Ref Range Status   02/05/2024 01:11 PM 0.35 0.00 - 0.70 x10*3/uL Final   11/15/2023 10:02 AM 0.13 0.00 - 0.70 x10*3/uL Final   10/09/2023 11:14 AM 0.04 0.00 - 0.70 x10*3/uL Final     Lymphocytes Absolute   Date/Time Value Ref Range Status   02/05/2024 01:11 PM 1.68 1.20 - 4.80 x10*3/uL Final   11/15/2023 10:02 AM 1.79 1.20 - 4.80 x10*3/uL Final   10/09/2023 11:14 AM 1.48 1.20 - 4.80 x10*3/uL Final     Monocytes Absolute   Date/Time Value Ref Range Status   02/05/2024 01:11 PM 0.64 0.10 - 1.00 x10*3/uL Final   11/15/2023 10:02 AM 0.51 0.10 - 1.00 x10*3/uL Final   10/09/2023 11:14 AM 0.37 0.10 - 1.00 x10*3/uL Final     Eosinophils Absolute   Date/Time Value Ref Range Status   02/05/2024 01:11 PM 0.06 0.00 - 0.70 x10*3/uL Final   11/15/2023 10:02 AM 0.07 0.00 - 0.70 x10*3/uL Final   10/09/2023 11:14 AM 0.05 0.00 - 0.70 x10*3/uL Final     Basophils Absolute   Date/Time Value Ref Range Status   02/05/2024 01:11 PM 0.02 0.00 - 0.10 x10*3/uL Final   11/15/2023 10:02 AM 0.03 0.00 - 0.10 x10*3/uL Final   10/09/2023 11:14 AM 0.02 0.00 - 0.10 x10*3/uL Final       Assessment/Plan         Assessment/Plan   Assessment   27 yo   woman with a PMH of anemia  nd was referred to benign hematology for consultation of  gestational anemia. .  Due date 2024. Recently hospitalized for abdominal pain. Remote history of iron deficiency anemia. Recently moved to [place] from [place]. Patient is feeeling well with her first pregnancy. No complaints noted today. Denies pica, bruising, bleeding MAHER or other symptoms of anemia. Endorses nausea and constipation which are being managed by OB.     Discussed possible etiologies including multifactorial, nutritional deficiencies, getational anemia, dilutional anemia, anemia of chronic disease, malabsorption, hemopathy, medications, bleeding, malignancy, etc. Patient stopped oral iron due to constipation. Her hemoglobin has remained stable in the mid 10 range. Iron studies are on the low end of normal. B12 is low at 170. We did discuss that hemoglobin electropheresis is normal. She was relieved to hear this. I do not think IV iron is indicated for this patient since her iron studies are within normal limits. I do recommend monthly B12 injections.   The patient is in agreement with the following plan:     PLAN   B12 1000mcg subcutaneous monthly at [place] start ASAP  Will see her 6 to 8 weeks post partum for re-evaluation.  3.    CBC diff and iron studies 6 to 8 weeks postpartum  4.    Patient should continue with prenatal vitamins    I had an extensive discussion with the patient regarding the diagnosis and discussed the plan of therapy, including general considerations regarding side effects and outcomes. Pt understood and gave appropriate teach back about the plan of care. All questions were answered to the patient's satisfaction. The patient is instructed to contact us at any time if questions or problems arise. Thank you for the opportunity to participate in the care of this very pleasant patient.                    Rosanne M Casal, APRN-CNP, DNP

## 2024-02-12 PROBLEM — Z3A.35 35 WEEKS GESTATION OF PREGNANCY (HHS-HCC): Status: ACTIVE | Noted: 2023-10-03

## 2024-02-13 ENCOUNTER — APPOINTMENT (OUTPATIENT)
Dept: HEMATOLOGY/ONCOLOGY | Facility: HOSPITAL | Age: 29
End: 2024-02-13
Payer: MEDICAID

## 2024-02-13 ENCOUNTER — TELEPHONE (OUTPATIENT)
Dept: OBSTETRICS AND GYNECOLOGY | Facility: CLINIC | Age: 29
End: 2024-02-13
Payer: MEDICAID

## 2024-02-13 NOTE — TELEPHONE ENCOUNTER
Spoke with patient. She is 36.2 weeks today. She needs to have an appointment this week. Patient added to Dr. Olson's schedule for tomorrow at 8am.

## 2024-02-13 NOTE — TELEPHONE ENCOUNTER
Patient called stating she is concerned about not being scheduled for weekly appts. She has an US on 2/16/24 and her next appt is 2/21/24. She believes she should be seen sooner.  Her last appt was 1/30/24. She is 36w2d pregnant. Please advise

## 2024-02-14 ENCOUNTER — ROUTINE PRENATAL (OUTPATIENT)
Dept: OBSTETRICS AND GYNECOLOGY | Facility: CLINIC | Age: 29
End: 2024-02-14
Payer: MEDICAID

## 2024-02-14 VITALS — BODY MASS INDEX: 30.21 KG/M2 | WEIGHT: 190 LBS | DIASTOLIC BLOOD PRESSURE: 60 MMHG | SYSTOLIC BLOOD PRESSURE: 110 MMHG

## 2024-02-14 DIAGNOSIS — Z3A.35 35 WEEKS GESTATION OF PREGNANCY (HHS-HCC): Primary | ICD-10-CM

## 2024-02-14 DIAGNOSIS — O99.013 ANEMIA AFFECTING PREGNANCY IN THIRD TRIMESTER (HHS-HCC): ICD-10-CM

## 2024-02-14 DIAGNOSIS — Z34.03 FIRST PREGNANCY, THIRD TRIMESTER (HHS-HCC): ICD-10-CM

## 2024-02-14 PROBLEM — Z3A.36 36 WEEKS GESTATION OF PREGNANCY (HHS-HCC): Status: ACTIVE | Noted: 2023-10-03

## 2024-02-14 PROCEDURE — 87081 CULTURE SCREEN ONLY: CPT

## 2024-02-14 PROCEDURE — 99213 OFFICE O/P EST LOW 20 MIN: CPT | Performed by: STUDENT IN AN ORGANIZED HEALTH CARE EDUCATION/TRAINING PROGRAM

## 2024-02-14 NOTE — PROGRESS NOTES
Subjective   Patient ID [id number]   Briana Phelps is a 28 y.o.  at 36w3d with a working estimated date of delivery of 3/10/2024, by Last Menstrual Period who presents for a routine prenatal visit. She denies vaginal bleeding, leakage of fluid, decreased fetal movements, or contractions.    Her pregnancy is complicated by: Iron deficient anemia.    Objective   Physical Exam:   Weight: 86.2 kg (190 lb)  Expected Total Weight Gain: 7 kg (15 lb)-11.5 kg (25 lb)   Pregravid BMI: 26.24  BP: 110/60  Fetal Heart Rate: 145 Fundal Height (cm): 36 cm Presentation: Vertex           Prenatal Labs  Urine Dip:  Lab Results   Component Value Date    KETONESU Negative 2023     Lab Results   Component Value Date    HGB 10.4 (L) 2024    HCT 32.2 (L) 2024    ABO B 2023    HEPBSAG NONREACTIVE 2023       Assessment/Plan   Problem List Items Addressed This Visit       First pregnancy, third trimester    Relevant Orders    Group B Streptococcus (GBS) Prenatal Screen, Culture    Anemia affecting pregnancy in third trimester    Overview     Hgb 10.1 with MCV of 88 and ferritin 48  Patient denies sickle cell  Normal hemoglobin electrophoresis  Followed by hematology         36 weeks gestation of pregnancy - Primary    Overview     --Labs at [facility] 2023: Normal Pap,  GC and Chlamydia negative,   --Patient started on ASA 2 daily on October 3  --Normal anatomy scan on , will repeat ultrasound at 36 weeks for growth, on   -- ANEMIA: Hgb 10.1, MCV 88, ferritin 48, saw hematology 10/9, will start iron (nl Hgb electrophoresis);  Hgb 10.6, patient unable to tolerate iron, did have ferritin of 48.  Heme is doing a B12 shot. They do not recommend infusion. We did discuss possibility of transfusion if patient has pph. She is agreeable to blood products.  -- Normal 50 g  --NAUSEA: Will try wrist bands did discuss option of Zofran            Follow up in 1 week for a routine  prenatal visit.

## 2024-02-16 ENCOUNTER — HOSPITAL ENCOUNTER (OUTPATIENT)
Dept: RADIOLOGY | Facility: CLINIC | Age: 29
Discharge: HOME | End: 2024-02-16
Payer: MEDICAID

## 2024-02-16 ENCOUNTER — INFUSION (OUTPATIENT)
Dept: HEMATOLOGY/ONCOLOGY | Facility: CLINIC | Age: 29
End: 2024-02-16
Payer: MEDICAID

## 2024-02-16 VITALS
TEMPERATURE: 98.1 F | HEART RATE: 116 BPM | RESPIRATION RATE: 16 BRPM | WEIGHT: 186.5 LBS | DIASTOLIC BLOOD PRESSURE: 73 MMHG | BODY MASS INDEX: 29.97 KG/M2 | SYSTOLIC BLOOD PRESSURE: 126 MMHG | OXYGEN SATURATION: 96 % | HEIGHT: 66 IN

## 2024-02-16 DIAGNOSIS — O99.013 ANEMIA AFFECTING PREGNANCY IN THIRD TRIMESTER (HHS-HCC): ICD-10-CM

## 2024-02-16 DIAGNOSIS — O99.019: ICD-10-CM

## 2024-02-16 DIAGNOSIS — E53.8 B12 DEFICIENCY: ICD-10-CM

## 2024-02-16 DIAGNOSIS — Z3A.32 32 WEEKS GESTATION OF PREGNANCY (HHS-HCC): ICD-10-CM

## 2024-02-16 PROCEDURE — 76819 FETAL BIOPHYS PROFIL W/O NST: CPT | Performed by: OBSTETRICS & GYNECOLOGY

## 2024-02-16 PROCEDURE — 76816 OB US FOLLOW-UP PER FETUS: CPT

## 2024-02-16 PROCEDURE — 76819 FETAL BIOPHYS PROFIL W/O NST: CPT

## 2024-02-16 PROCEDURE — 96372 THER/PROPH/DIAG INJ SC/IM: CPT | Performed by: NURSE PRACTITIONER

## 2024-02-16 PROCEDURE — 96372 THER/PROPH/DIAG INJ SC/IM: CPT

## 2024-02-16 PROCEDURE — 2500000004 HC RX 250 GENERAL PHARMACY W/ HCPCS (ALT 636 FOR OP/ED): Mod: SE | Performed by: NURSE PRACTITIONER

## 2024-02-16 PROCEDURE — 76816 OB US FOLLOW-UP PER FETUS: CPT | Performed by: OBSTETRICS & GYNECOLOGY

## 2024-02-16 RX ORDER — CYANOCOBALAMIN 1000 UG/ML
1000 INJECTION, SOLUTION INTRAMUSCULAR; SUBCUTANEOUS ONCE
Status: CANCELLED | OUTPATIENT
Start: 2024-03-15

## 2024-02-16 RX ORDER — FAMOTIDINE 10 MG/ML
20 INJECTION INTRAVENOUS ONCE AS NEEDED
Status: CANCELLED | OUTPATIENT
Start: 2024-03-15

## 2024-02-16 RX ORDER — EPINEPHRINE 0.3 MG/.3ML
0.3 INJECTION SUBCUTANEOUS EVERY 5 MIN PRN
Status: CANCELLED | OUTPATIENT
Start: 2024-03-15

## 2024-02-16 RX ORDER — DIPHENHYDRAMINE HYDROCHLORIDE 50 MG/ML
50 INJECTION INTRAMUSCULAR; INTRAVENOUS AS NEEDED
Status: CANCELLED | OUTPATIENT
Start: 2024-03-15

## 2024-02-16 RX ORDER — ALBUTEROL SULFATE 0.83 MG/ML
3 SOLUTION RESPIRATORY (INHALATION) AS NEEDED
Status: CANCELLED | OUTPATIENT
Start: 2024-03-15

## 2024-02-16 RX ORDER — CYANOCOBALAMIN 1000 UG/ML
1000 INJECTION, SOLUTION INTRAMUSCULAR; SUBCUTANEOUS ONCE
Status: COMPLETED | OUTPATIENT
Start: 2024-02-16 | End: 2024-02-16

## 2024-02-16 RX ADMIN — CYANOCOBALAMIN 1000 MCG: 1000 INJECTION INTRAMUSCULAR; SUBCUTANEOUS at 11:22

## 2024-02-16 ASSESSMENT — PAIN SCALES - GENERAL: PAINLEVEL: 0-NO PAIN

## 2024-02-16 NOTE — PROGRESS NOTES
Pt here for B12 injection. Pt tolerated injection well. She is aware of plan of care, pt states she is due to deliver baby 3/10, next injection is due 3/15, pt informed to call office if unable to make appt. Will call with further questions or concerns.

## 2024-02-17 LAB — GP B STREP GENITAL QL CULT: NORMAL

## 2024-02-21 ENCOUNTER — APPOINTMENT (OUTPATIENT)
Dept: OBSTETRICS AND GYNECOLOGY | Facility: CLINIC | Age: 29
End: 2024-02-21
Payer: MEDICAID

## 2024-02-21 ENCOUNTER — ROUTINE PRENATAL (OUTPATIENT)
Dept: OBSTETRICS AND GYNECOLOGY | Facility: CLINIC | Age: 29
End: 2024-02-21
Payer: MEDICAID

## 2024-02-21 VITALS — SYSTOLIC BLOOD PRESSURE: 104 MMHG | BODY MASS INDEX: 30.05 KG/M2 | WEIGHT: 189 LBS | DIASTOLIC BLOOD PRESSURE: 60 MMHG

## 2024-02-21 DIAGNOSIS — Z3A.37 37 WEEKS GESTATION OF PREGNANCY (HHS-HCC): Primary | ICD-10-CM

## 2024-02-21 DIAGNOSIS — O99.013 ANEMIA AFFECTING PREGNANCY IN THIRD TRIMESTER (HHS-HCC): ICD-10-CM

## 2024-02-21 DIAGNOSIS — Z34.03 FIRST PREGNANCY, THIRD TRIMESTER (HHS-HCC): ICD-10-CM

## 2024-02-21 DIAGNOSIS — Z3A.36 36 WEEKS GESTATION OF PREGNANCY (HHS-HCC): ICD-10-CM

## 2024-02-21 DIAGNOSIS — Z3A.39 39 WEEKS GESTATION OF PREGNANCY (HHS-HCC): ICD-10-CM

## 2024-02-21 DIAGNOSIS — E53.8 B12 DEFICIENCY: ICD-10-CM

## 2024-02-21 LAB
POC GLUCOSE, URINE: NEGATIVE MG/DL
POC PROTEIN, URINE: ABNORMAL MG/DL

## 2024-02-21 PROCEDURE — 99213 OFFICE O/P EST LOW 20 MIN: CPT | Performed by: STUDENT IN AN ORGANIZED HEALTH CARE EDUCATION/TRAINING PROGRAM

## 2024-02-21 NOTE — PROGRESS NOTES
Subjective   Patient ID [id number]   Briana Phelps is a 28 y.o.  at 37w3d with a working estimated date of delivery of 3/10/2024, by Last Menstrual Period who presents for a routine prenatal visit. She denies vaginal bleeding, leakage of fluid, decreased fetal movements, or contractions.    Her pregnancy is complicated by: Iron deficient anemia.     Objective   Physical Exam:   Weight: 85.7 kg (189 lb)  Expected Total Weight Gain: 7 kg (15 lb)-11.5 kg (25 lb)   Pregravid BMI: 26.24  BP: 104/60  Fetal Heart Rate: 150 Fundal Height (cm): 37 cm Presentation: Vertex  Dilation: Closed        Prenatal Labs  Urine Dip:  Lab Results   Component Value Date    KETONESU Negative 2023     Lab Results   Component Value Date    HGB 10.4 (L) 2024    HCT 32.2 (L) 2024    ABO B 2023    HEPBSAG NONREACTIVE 2023       Assessment/Plan   Problem List Items Addressed This Visit       First pregnancy, third trimester    Anemia affecting pregnancy in third trimester    Overview     Hgb 10.1 with MCV of 88 and ferritin 48  Patient denies sickle cell  Normal hemoglobin electrophoresis  Followed by hematology         37 weeks gestation of pregnancy - Primary    Overview     --Labs at [facility] 2023: Normal Pap,  GC and Chlamydia negative,   --Patient started on ASA 2 daily on October 3  --ULTRASOUND: Normal anatomy scan on ;  at 36 wks, 2.922 kg, 46%;  -- ANEMIA: Hgb 10.1, MCV 88, ferritin 48, saw hematology 10/9, will start iron (nl Hgb electrophoresis);  Hgb 10.6, patient unable to tolerate iron, did have ferritin of 48.  Heme is doing a B12 shot. They do not recommend infusion. We did discuss possibility of transfusion if patient has pph. She is agreeable to blood products.  -- Normal 50 g  --NAUSEA: Will try wrist bands did discuss option of Zofran  IOL scheduled on 3/5.         Relevant Orders    POCT UA Automated manually resulted (Completed)    B12 deficiency     Other  Visit Diagnoses       39 weeks gestation of pregnancy        Relevant Orders    Labor Induction              Follow up in 1 week for a routine prenatal visit.

## 2024-02-23 PROBLEM — Z3A.37 37 WEEKS GESTATION OF PREGNANCY (HHS-HCC): Status: ACTIVE | Noted: 2023-10-03

## 2024-02-24 ENCOUNTER — HOSPITAL ENCOUNTER (EMERGENCY)
Facility: HOSPITAL | Age: 29
Discharge: STILL ADMITTED OR ADMINISTRATIVE | End: 2024-02-25
Attending: STUDENT IN AN ORGANIZED HEALTH CARE EDUCATION/TRAINING PROGRAM
Payer: MEDICAID

## 2024-02-24 VITALS
TEMPERATURE: 98 F | BODY MASS INDEX: 30.22 KG/M2 | WEIGHT: 188 LBS | HEIGHT: 66 IN | HEART RATE: 85 BPM | SYSTOLIC BLOOD PRESSURE: 119 MMHG | DIASTOLIC BLOOD PRESSURE: 77 MMHG | RESPIRATION RATE: 18 BRPM | OXYGEN SATURATION: 100 %

## 2024-02-24 DIAGNOSIS — O47.9 UTERINE CONTRACTIONS (HHS-HCC): Primary | ICD-10-CM

## 2024-02-24 PROCEDURE — 99281 EMR DPT VST MAYX REQ PHY/QHP: CPT

## 2024-02-24 PROCEDURE — 99283 EMERGENCY DEPT VISIT LOW MDM: CPT

## 2024-02-24 ASSESSMENT — COLUMBIA-SUICIDE SEVERITY RATING SCALE - C-SSRS
1. IN THE PAST MONTH, HAVE YOU WISHED YOU WERE DEAD OR WISHED YOU COULD GO TO SLEEP AND NOT WAKE UP?: NO
2. HAVE YOU ACTUALLY HAD ANY THOUGHTS OF KILLING YOURSELF?: NO
6. HAVE YOU EVER DONE ANYTHING, STARTED TO DO ANYTHING, OR PREPARED TO DO ANYTHING TO END YOUR LIFE?: NO

## 2024-02-24 ASSESSMENT — PAIN SCALES - GENERAL: PAINLEVEL_OUTOF10: 7

## 2024-02-24 ASSESSMENT — PAIN - FUNCTIONAL ASSESSMENT: PAIN_FUNCTIONAL_ASSESSMENT: 0-10

## 2024-02-24 NOTE — Clinical Note
Patient departs ED via wheelchair to [facility] for private vehicle transport to [facility]. No distress noted at time of departure. resp easy and unlabored with chest rise and fall observed. patient alert and oriented x4. Report called to ABEL Watt

## 2024-02-25 ENCOUNTER — HOSPITAL ENCOUNTER (OUTPATIENT)
Facility: HOSPITAL | Age: 29
Discharge: HOME | End: 2024-02-25
Attending: OBSTETRICS & GYNECOLOGY | Admitting: OBSTETRICS & GYNECOLOGY
Payer: MEDICAID

## 2024-02-25 VITALS
HEIGHT: 66 IN | TEMPERATURE: 97.5 F | DIASTOLIC BLOOD PRESSURE: 65 MMHG | WEIGHT: 191.03 LBS | SYSTOLIC BLOOD PRESSURE: 115 MMHG | RESPIRATION RATE: 16 BRPM | BODY MASS INDEX: 30.7 KG/M2 | HEART RATE: 81 BPM | OXYGEN SATURATION: 99 %

## 2024-02-25 DIAGNOSIS — K21.9 GASTROESOPHAGEAL REFLUX DISEASE WITHOUT ESOPHAGITIS: Primary | ICD-10-CM

## 2024-02-25 PROCEDURE — G0378 HOSPITAL OBSERVATION PER HR: HCPCS

## 2024-02-25 PROCEDURE — 2500000002 HC RX 250 W HCPCS SELF ADMINISTERED DRUGS (ALT 637 FOR MEDICARE OP, ALT 636 FOR OP/ED): Performed by: OBSTETRICS & GYNECOLOGY

## 2024-02-25 RX ORDER — HYDRALAZINE HYDROCHLORIDE 20 MG/ML
5 INJECTION INTRAMUSCULAR; INTRAVENOUS ONCE AS NEEDED
Status: DISCONTINUED | OUTPATIENT
Start: 2024-02-25 | End: 2024-02-25 | Stop reason: HOSPADM

## 2024-02-25 RX ORDER — LABETALOL HYDROCHLORIDE 5 MG/ML
20 INJECTION, SOLUTION INTRAVENOUS ONCE AS NEEDED
Status: DISCONTINUED | OUTPATIENT
Start: 2024-02-25 | End: 2024-02-25 | Stop reason: HOSPADM

## 2024-02-25 RX ORDER — LIDOCAINE HYDROCHLORIDE 10 MG/ML
0.5 INJECTION INFILTRATION; PERINEURAL ONCE AS NEEDED
Status: DISCONTINUED | OUTPATIENT
Start: 2024-02-25 | End: 2024-02-25 | Stop reason: HOSPADM

## 2024-02-25 RX ORDER — OMEPRAZOLE 20 MG/1
20 CAPSULE, DELAYED RELEASE ORAL
Qty: 30 CAPSULE | Refills: 1 | Status: SHIPPED | OUTPATIENT
Start: 2024-02-25

## 2024-02-25 RX ORDER — OMEPRAZOLE 10 MG/1
20 CAPSULE, DELAYED RELEASE ORAL
Status: DISCONTINUED | OUTPATIENT
Start: 2024-02-25 | End: 2024-02-25

## 2024-02-25 RX ORDER — ONDANSETRON HYDROCHLORIDE 2 MG/ML
4 INJECTION, SOLUTION INTRAVENOUS EVERY 6 HOURS PRN
Status: DISCONTINUED | OUTPATIENT
Start: 2024-02-25 | End: 2024-02-25 | Stop reason: HOSPADM

## 2024-02-25 RX ORDER — PANTOPRAZOLE SODIUM 40 MG/1
40 TABLET, DELAYED RELEASE ORAL
Qty: 1 TABLET | Refills: 0 | Status: SHIPPED | OUTPATIENT
Start: 2024-02-26 | End: 2024-02-25 | Stop reason: SDUPTHER

## 2024-02-25 RX ORDER — NIFEDIPINE 10 MG/1
10 CAPSULE ORAL ONCE AS NEEDED
Status: DISCONTINUED | OUTPATIENT
Start: 2024-02-25 | End: 2024-02-25 | Stop reason: HOSPADM

## 2024-02-25 RX ORDER — PANTOPRAZOLE SODIUM 40 MG/1
40 TABLET, DELAYED RELEASE ORAL
Status: COMPLETED | OUTPATIENT
Start: 2024-02-25 | End: 2024-02-25

## 2024-02-25 RX ORDER — PANTOPRAZOLE SODIUM 40 MG/1
40 TABLET, DELAYED RELEASE ORAL
Status: DISCONTINUED | OUTPATIENT
Start: 2024-02-26 | End: 2024-02-25 | Stop reason: HOSPADM

## 2024-02-25 RX ORDER — PANTOPRAZOLE SODIUM 40 MG/1
40 TABLET, DELAYED RELEASE ORAL
Status: DISCONTINUED | OUTPATIENT
Start: 2024-02-25 | End: 2024-02-25

## 2024-02-25 RX ORDER — ONDANSETRON 4 MG/1
4 TABLET, FILM COATED ORAL EVERY 6 HOURS PRN
Status: DISCONTINUED | OUTPATIENT
Start: 2024-02-25 | End: 2024-02-25 | Stop reason: HOSPADM

## 2024-02-25 RX ORDER — PANTOPRAZOLE SODIUM 40 MG/1
40 TABLET, DELAYED RELEASE ORAL
Qty: 30 TABLET | Refills: 1 | Status: SHIPPED | OUTPATIENT
Start: 2024-02-26 | End: 2024-02-25 | Stop reason: HOSPADM

## 2024-02-25 RX ORDER — PANTOPRAZOLE SODIUM 40 MG/1
40 TABLET, DELAYED RELEASE ORAL
Qty: 30 TABLET | Refills: 0 | Status: SHIPPED | OUTPATIENT
Start: 2024-02-26 | End: 2024-02-25 | Stop reason: SDUPTHER

## 2024-02-25 RX ADMIN — PANTOPRAZOLE SODIUM 40 MG: 40 TABLET, DELAYED RELEASE ORAL at 02:12

## 2024-02-25 ASSESSMENT — PAIN SCALES - GENERAL: PAINLEVEL_OUTOF10: 0 - NO PAIN

## 2024-02-25 NOTE — H&P
Obstetrical Admission History and Physical     Briana Phelps is a 28 y.o. .  In triage with complaint of heartburn for a couple days with emesis.  Patient has had occasional contraction.  Patient with good fetal movement.    Chief Complaint: Contractions and Nausea/Vomiting In Pregnancy (N/V for the last day. Intermittent cramping)    Assessment/Plan    IUP at 38.0 weeks with HEARTBURN:  Will treat patient with Protonix and if tolerates diet will discharge home.  Will discharge with omeprazole daily             Subjective   Briana is here complaining of Good fetal movement.  Occasional contractions     Obstetrical History   OB History    Para Term  AB Living   1 0 0 0 0 0   SAB IAB Ectopic Multiple Live Births   0 0 0   0      # Outcome Date GA Lbr [place]/2nd Weight Sex Delivery Anes PTL Lv   1 Current                Past Medical History  Past Medical History:   Diagnosis Date    Nausea/vomiting in pregnancy         Past Surgical History   No past surgical history on file.    Allergies  Patient has no known allergies.     Objective    Last Vitals  Temp Pulse Resp BP MAP O2 Sat   36 °C (96.8 °F) 90 16 118/66   99 %     Physical Examination  GENERAL: Examination reveals a well developed, well nourished, gravid female in no acute distress. She is alert and cooperative.  ABDOMEN: soft, gravid, nontender, nondistended, no abnormal masses, no epigastric pain  FHR is 140 , with Accelerations, and a reactive  tracing.    CERVIX: 1 cm dilated, 40  % effaced,   EXTREMITIES: no redness or tenderness in the calves or thighs, no edema  PSYCHOLOGICAL: awake and alert; oriented to person, place, and time

## 2024-02-25 NOTE — DISCHARGE SUMMARY
Discharge Summary    27yo  at 38 weeks with compliant of heartburn and emesis for several days.   Briana states that her symptoms have improved after dose of Protonix and she is able to keep down foods and fluids.   Ok for discharge home now with prescription for omeprazole daily  Dr. Ayala aware of patient status and plan of care.     Admission Date: 2024  Discharge Date: 2024    Discharge Diagnosis  GERD    Hospital Course  GERD symptoms improved with Protonix  Able to tolerate regular diet     Procedures:  NST    Pertinent Physical Exam At Time of Discharge  GENERAL: Examination reveals a well developed, well nourished, gravid female in no acute distress and whose affect is appropriate. She is alert and cooperative.  LUNGS:  unlabored breathing  HEART:  regular HR  ABDOMEN: soft, gravid, nontender, nondistended, no abnormal masses, no epigastric pain  FHR is  , with Accelerations, and a   tracing.    Pioneer reading:    EXTREMITIES: no redness or tenderness in the calves or thighs, no edema  SKIN: normal coloration and turgor, no rashes  NEUROLOGICAL: alert, oriented, normal speech, no focal findings or movement disorder noted  PSYCHOLOGICAL: awake and alert; oriented to person, place, and time      Discharge Meds     Your medication list        CONTINUE taking these medications        Instructions Last Dose Given Next Dose Due   docusate sodium 100 mg capsule  Commonly known as: Colace      Take one tablet two times daily as needed for constipation from iron pills.       ferrous sulfate (325 mg ferrous sulfate) tablet  Commonly known as: FerrouSuL      Take one tablet by mouth twice a day. Please take with food to prevent upset stomach.       M-Laurie Plus 27 mg iron- 1 mg tablet  Generic drug: prenatal vitamin calcium-iron-folic           pantoprazole 40 mg EC tablet  Commonly known as: ProtoNix  Start taking on: 2024      Take 1 tablet (40 mg) by mouth once daily in the morning.  Take before meals. Do not crush, chew, or split. Do not start before February 26, 2024.       polyethylene glycol 17 gram/dose powder  Commonly known as: Miralax      Mix one capful (17 grams) of powder into 12 ounces of water or juice and take by mouth twice daily as needed for constipation       Prenatal Low Iron 27 mg iron- 1 mg tablet  Generic drug: prenatal vit,jose d 74-iron-folic           pyridoxine 100 mg tablet  Commonly known as: Vitamin B-6                     Where to Get Your Medications        These medications were sent to [facility]/pharmacy #[id number] [address] AT [address] [address]      Phone: [phone]   pantoprazole 40 mg EC tablet          Complications Requiring Follow-Up  Follow up on Wednesday for routine OB visit or sooner as needed    Test Results Pending At Discharge  Pending Labs       No current pending labs.            Outpatient Follow-Up  Future Appointments   Date Time Provider Department Center   2/28/2024  9:10 AM Julien Olson MD [id number] [facility]   3/5/2024  8:00 AM RBC AHU L&D PROCEDURE ROOM [id number] [facility]   3/6/2024  8:20 AM Julien Olson MD [id number] [facility]   3/13/2024  9:10 AM MD MEAGAN [id number] [facility]   3/15/2024 11:00 AM INF 00 [place] [id number] [facility]   4/16/2024  9:30 AM Rosanne M Casal, APRN-CNP, DNP [id number] [facility]       I spent 20 minutes in the professional and overall care of this patient.      HOSSEIN Enamorado

## 2024-02-25 NOTE — ED PROVIDER NOTES
"    HPI  Briana Phelps is a 28 y.o. female who is 37 weeks and 6 days pregnant who is presenting with cramping and vomiting.  This is her first pregnancy.  Patient states she has had inconsistent cramps in her abdomen over the last few days.  Patient states her last cramping was a few hours ago.  She notes however that she has been vomiting for the past day.  Patient states she has been unable to keep anything down.  Patient notes she last saw her OB a week ago.  Patient denies any gush of fluid, vaginal bleeding.  She endorses good fetal movement.    PMH  Past Medical History:   Diagnosis Date    Nausea/vomiting in pregnancy        Meds  Current Outpatient Medications   Medication Instructions    docusate sodium (Colace) 100 mg capsule Take one tablet two times daily as needed for constipation from iron pills.    ferrous sulfate (FerrouSuL) 325 (65 Fe) MG tablet Take one tablet by mouth twice a day. Please take with food to prevent upset stomach.    M- Plus 27 mg iron- 1 mg tablet 1 tablet, oral, Daily    polyethylene glycol (Miralax) 17 gram/dose powder Mix one capful (17 grams) of powder into 12 ounces of water or juice and take by mouth twice daily as needed for constipation    prenatal vit,jose d 74-iron-folic (Prenatal Low Iron) 27 mg iron- 1 mg tablet oral    pyridoxine (VITAMIN B-6) 100 mg, oral, Daily       Allergies  No Known Allergies     SHx  Social History     Tobacco Use    Smoking status: Never    Smokeless tobacco: Never   Vaping Use    Vaping Use: Never used   Substance Use Topics    Alcohol use: Not Currently    Drug use: Not Currently       ------------------------------------------------------------------------------------------------------------------------------------------    BP (!) 142/97   Pulse 94   Temp 36.7 °C (98 °F) (Temporal)   Resp 20   Ht 1.676 m (5' 6\")   Wt 85.3 kg (188 lb)   LMP 2023 (Exact Date)   SpO2 100%   BMI 30.34 kg/m²     Physical " Exam  Constitutional:       General: She is not in acute distress.  HENT:      Head: Normocephalic and atraumatic.      Mouth/Throat:      Mouth: Mucous membranes are moist.   Eyes:      Extraocular Movements: Extraocular movements intact.      Conjunctiva/sclera: Conjunctivae normal.      Pupils: Pupils are equal, round, and reactive to light.   Cardiovascular:      Rate and Rhythm: Normal rate and regular rhythm.      Heart sounds: No murmur heard.     No gallop.   Pulmonary:      Effort: Pulmonary effort is normal. No respiratory distress.      Breath sounds: Normal breath sounds. No wheezing.   Abdominal:      General: There is no distension.      Palpations: Abdomen is soft.      Tenderness: There is no abdominal tenderness. There is no guarding.      Comments: Gravid abdomen   Musculoskeletal:         General: No swelling or signs of injury. Normal range of motion.   Skin:     General: Skin is warm and dry.      Findings: No lesion or rash.   Neurological:      General: No focal deficit present.      Mental Status: She is alert and oriented to person, place, and time. Mental status is at baseline.   Psychiatric:         Mood and Affect: Mood normal.         Judgment: Judgment normal.          ------------------------------------------------------------------------------------------------------------------------------------------    Medical Decision Making: Patient is a 28-year-old female who is 37 weeks and 6 days pregnant.  This is her first pregnancy.  Patient is presenting with inconsistent cramping as well as vomiting.  Patient is mildly tachycardic but otherwise hemodynamically stable on initial presentation.  She does not appear in labor however I will check her cervix.  Cervix was about 1 to 2 cm dilated.  I did ultrasound the fetus and the heart rate was 158.  Patient is overall very well-appearing.  I discussed the patient with Dr. Ayala who accepted the patient for transfer to [facility] to be  evaluated by labor and delivery.  Patient remained stable and will go by private vehicle.  No evidence of impending delivery at this time.  Patient was discharged with instructions to go immediately to [facility] labor and delivery for further evaluation.      Discussed with: Dr. Ayala with OB    Diagnoses as of 02/25/24 1759   Uterine contractions          Rossi Farrar MD  02/24/24 8416       Rossi Farrar MD  02/25/24 2971

## 2024-02-27 ENCOUNTER — APPOINTMENT (OUTPATIENT)
Dept: OBSTETRICS AND GYNECOLOGY | Facility: CLINIC | Age: 29
End: 2024-02-27
Payer: MEDICAID

## 2024-02-28 ENCOUNTER — ROUTINE PRENATAL (OUTPATIENT)
Dept: OBSTETRICS AND GYNECOLOGY | Facility: CLINIC | Age: 29
End: 2024-02-28
Payer: MEDICAID

## 2024-02-28 VITALS — BODY MASS INDEX: 30.99 KG/M2 | WEIGHT: 192 LBS | DIASTOLIC BLOOD PRESSURE: 60 MMHG | SYSTOLIC BLOOD PRESSURE: 108 MMHG

## 2024-02-28 DIAGNOSIS — Z3A.38 38 WEEKS GESTATION OF PREGNANCY (HHS-HCC): Primary | ICD-10-CM

## 2024-02-28 DIAGNOSIS — Z3A.37 37 WEEKS GESTATION OF PREGNANCY (HHS-HCC): ICD-10-CM

## 2024-02-28 LAB
POC GLUCOSE, URINE: NEGATIVE MG/DL
POC PROTEIN, URINE: NORMAL MG/DL

## 2024-02-28 PROCEDURE — 99213 OFFICE O/P EST LOW 20 MIN: CPT | Performed by: STUDENT IN AN ORGANIZED HEALTH CARE EDUCATION/TRAINING PROGRAM

## 2024-02-28 NOTE — PROGRESS NOTES
Subjective   Patient ID [id number]   Briana Phelps is a 28 y.o.  at 38w3d with a working estimated date of delivery of 3/10/2024, by Last Menstrual Period who presents for a routine prenatal visit. She denies vaginal bleeding, leakage of fluid, decreased fetal movements, or contractions.    Her pregnancy is complicated by: Anemia.      Objective   Physical Exam:   Weight: 87.1 kg (192 lb)  Expected Total Weight Gain: 7 kg (15 lb)-11.5 kg (25 lb)   Pregravid BMI: 26.64  BP: 108/60  Fetal Heart Rate: 135 Fundal Height (cm): 38 cm Presentation: Vertex         Spec exam performed for LOF. No pooling. Nitrazine negative.    Prenatal Labs  Urine Dip:  Lab Results   Component Value Date    KETONESU Negative 2023     Lab Results   Component Value Date    HGB 10.4 (L) 2024    HCT 32.2 (L) 2024    ABO B 2023    HEPBSAG NONREACTIVE 2023       Assessment/Plan   Problem List Items Addressed This Visit       38 weeks gestation of pregnancy - Primary    Overview     --Labs at [facility] 2023: Normal Pap,  GC and Chlamydia negative,   --Patient started on ASA 2 daily on October 3  --ULTRASOUND: Normal anatomy scan on ;  at 36 wks, 2.922 kg, 46%;  -- ANEMIA: Hgb 10.1, MCV 88, ferritin 48, saw hematology 10/9, will start iron (nl Hgb electrophoresis);  Hgb 10.6, patient unable to tolerate iron, did have ferritin of 48.  Heme is doing a B12 shot. They do not recommend infusion. We did discuss possibility of transfusion if patient has pph. She is agreeable to blood products.  -- Normal 50 g  --NAUSEA: Will try wrist bands did discuss option of Zofran  IOL scheduled on 3/5.         Relevant Orders    POCT UA Automated manually resulted (Completed)       Follow up in 1 week for a routine prenatal visit.

## 2024-03-05 ENCOUNTER — APPOINTMENT (OUTPATIENT)
Dept: OBSTETRICS AND GYNECOLOGY | Facility: HOSPITAL | Age: 29
End: 2024-03-05
Payer: MEDICAID

## 2024-03-05 ENCOUNTER — ANESTHESIA EVENT (OUTPATIENT)
Dept: OBSTETRICS AND GYNECOLOGY | Facility: HOSPITAL | Age: 29
End: 2024-03-05
Payer: MEDICAID

## 2024-03-05 ENCOUNTER — HOSPITAL ENCOUNTER (INPATIENT)
Facility: HOSPITAL | Age: 29
LOS: 3 days | Discharge: HOME | End: 2024-03-08
Attending: OBSTETRICS & GYNECOLOGY | Admitting: OBSTETRICS & GYNECOLOGY
Payer: MEDICAID

## 2024-03-05 ENCOUNTER — APPOINTMENT (OUTPATIENT)
Dept: OBSTETRICS AND GYNECOLOGY | Facility: CLINIC | Age: 29
End: 2024-03-05
Payer: MEDICAID

## 2024-03-05 ENCOUNTER — ANESTHESIA (OUTPATIENT)
Dept: OBSTETRICS AND GYNECOLOGY | Facility: HOSPITAL | Age: 29
End: 2024-03-05
Payer: MEDICAID

## 2024-03-05 DIAGNOSIS — Z3A.39 39 WEEKS GESTATION OF PREGNANCY (HHS-HCC): ICD-10-CM

## 2024-03-05 PROBLEM — K21.9 GASTROESOPHAGEAL REFLUX DISEASE: Status: ACTIVE | Noted: 2024-03-05

## 2024-03-05 LAB
ABO GROUP (TYPE) IN BLOOD: NORMAL
ANTIBODY SCREEN: NORMAL
ERYTHROCYTE [DISTWIDTH] IN BLOOD BY AUTOMATED COUNT: 14.2 % (ref 11.5–14.5)
HCT VFR BLD AUTO: 34 % (ref 36–46)
HGB BLD-MCNC: 10.8 G/DL (ref 12–16)
MCH RBC QN AUTO: 28.7 PG (ref 26–34)
MCHC RBC AUTO-ENTMCNC: 31.8 G/DL (ref 32–36)
MCV RBC AUTO: 90 FL (ref 80–100)
NRBC BLD-RTO: 0 /100 WBCS (ref 0–0)
PLATELET # BLD AUTO: 268 X10*3/UL (ref 150–450)
RBC # BLD AUTO: 3.76 X10*6/UL (ref 4–5.2)
RH FACTOR (ANTIGEN D): NORMAL
TREPONEMA PALLIDUM IGG+IGM AB [PRESENCE] IN SERUM OR PLASMA BY IMMUNOASSAY: NONREACTIVE
WBC # BLD AUTO: 10.3 X10*3/UL (ref 4.4–11.3)

## 2024-03-05 PROCEDURE — 86780 TREPONEMA PALLIDUM: CPT | Mod: AHULAB | Performed by: OBSTETRICS & GYNECOLOGY

## 2024-03-05 PROCEDURE — 2500000001 HC RX 250 WO HCPCS SELF ADMINISTERED DRUGS (ALT 637 FOR MEDICARE OP): Performed by: OBSTETRICS & GYNECOLOGY

## 2024-03-05 PROCEDURE — 1220000001 HC OB SEMI-PRIVATE ROOM DAILY

## 2024-03-05 PROCEDURE — 2500000004 HC RX 250 GENERAL PHARMACY W/ HCPCS (ALT 636 FOR OP/ED): Performed by: OBSTETRICS & GYNECOLOGY

## 2024-03-05 PROCEDURE — 2720000007 HC OR 272 NO HCPCS

## 2024-03-05 PROCEDURE — 3E033VJ INTRODUCTION OF OTHER HORMONE INTO PERIPHERAL VEIN, PERCUTANEOUS APPROACH: ICD-10-PCS | Performed by: ADVANCED PRACTICE MIDWIFE

## 2024-03-05 PROCEDURE — 59050 FETAL MONITOR W/REPORT: CPT

## 2024-03-05 PROCEDURE — 85027 COMPLETE CBC AUTOMATED: CPT | Performed by: OBSTETRICS & GYNECOLOGY

## 2024-03-05 PROCEDURE — 36415 COLL VENOUS BLD VENIPUNCTURE: CPT | Performed by: OBSTETRICS & GYNECOLOGY

## 2024-03-05 PROCEDURE — 86901 BLOOD TYPING SEROLOGIC RH(D): CPT | Performed by: OBSTETRICS & GYNECOLOGY

## 2024-03-05 RX ORDER — METOCLOPRAMIDE 10 MG/1
10 TABLET ORAL EVERY 6 HOURS PRN
Status: DISCONTINUED | OUTPATIENT
Start: 2024-03-05 | End: 2024-03-08 | Stop reason: HOSPADM

## 2024-03-05 RX ORDER — METOCLOPRAMIDE HYDROCHLORIDE 5 MG/ML
10 INJECTION INTRAMUSCULAR; INTRAVENOUS EVERY 6 HOURS PRN
Status: DISCONTINUED | OUTPATIENT
Start: 2024-03-05 | End: 2024-03-08 | Stop reason: HOSPADM

## 2024-03-05 RX ORDER — ONDANSETRON 4 MG/1
4 TABLET, FILM COATED ORAL EVERY 6 HOURS PRN
Status: DISCONTINUED | OUTPATIENT
Start: 2024-03-05 | End: 2024-03-08 | Stop reason: HOSPADM

## 2024-03-05 RX ORDER — SODIUM CHLORIDE, SODIUM LACTATE, POTASSIUM CHLORIDE, CALCIUM CHLORIDE 600; 310; 30; 20 MG/100ML; MG/100ML; MG/100ML; MG/100ML
125 INJECTION, SOLUTION INTRAVENOUS CONTINUOUS
Status: DISCONTINUED | OUTPATIENT
Start: 2024-03-05 | End: 2024-03-08 | Stop reason: HOSPADM

## 2024-03-05 RX ORDER — OXYTOCIN/0.9 % SODIUM CHLORIDE 30/500 ML
2-30 PLASTIC BAG, INJECTION (ML) INTRAVENOUS CONTINUOUS
Status: DISCONTINUED | OUTPATIENT
Start: 2024-03-05 | End: 2024-03-08 | Stop reason: HOSPADM

## 2024-03-05 RX ORDER — LABETALOL HYDROCHLORIDE 5 MG/ML
20 INJECTION, SOLUTION INTRAVENOUS ONCE AS NEEDED
Status: DISCONTINUED | OUTPATIENT
Start: 2024-03-05 | End: 2024-03-06

## 2024-03-05 RX ORDER — TERBUTALINE SULFATE 1 MG/ML
0.25 INJECTION SUBCUTANEOUS ONCE AS NEEDED
Status: DISCONTINUED | OUTPATIENT
Start: 2024-03-05 | End: 2024-03-06

## 2024-03-05 RX ORDER — NIFEDIPINE 10 MG/1
10 CAPSULE ORAL ONCE AS NEEDED
Status: DISCONTINUED | OUTPATIENT
Start: 2024-03-05 | End: 2024-03-06

## 2024-03-05 RX ORDER — ONDANSETRON HYDROCHLORIDE 2 MG/ML
4 INJECTION, SOLUTION INTRAVENOUS EVERY 6 HOURS PRN
Status: DISCONTINUED | OUTPATIENT
Start: 2024-03-05 | End: 2024-03-08 | Stop reason: HOSPADM

## 2024-03-05 RX ORDER — HYDRALAZINE HYDROCHLORIDE 20 MG/ML
5 INJECTION INTRAMUSCULAR; INTRAVENOUS ONCE AS NEEDED
Status: DISCONTINUED | OUTPATIENT
Start: 2024-03-05 | End: 2024-03-06

## 2024-03-05 RX ADMIN — SODIUM CHLORIDE, POTASSIUM CHLORIDE, SODIUM LACTATE AND CALCIUM CHLORIDE 500 ML: 600; 310; 30; 20 INJECTION, SOLUTION INTRAVENOUS at 19:29

## 2024-03-05 RX ADMIN — MISOPROSTOL 25 MCG: 100 TABLET ORAL at 10:49

## 2024-03-05 RX ADMIN — MISOPROSTOL 25 MCG: 100 TABLET ORAL at 19:58

## 2024-03-05 RX ADMIN — MISOPROSTOL 25 MCG: 100 TABLET ORAL at 23:09

## 2024-03-05 RX ADMIN — MISOPROSTOL 25 MCG: 100 TABLET ORAL at 13:46

## 2024-03-05 SDOH — SOCIAL STABILITY: SOCIAL INSECURITY: DOES ANYONE TRY TO KEEP YOU FROM HAVING/CONTACTING OTHER FRIENDS OR DOING THINGS OUTSIDE YOUR HOME?: NO

## 2024-03-05 SDOH — SOCIAL STABILITY: SOCIAL INSECURITY: HAS ANYONE EVER THREATENED TO HURT YOUR FAMILY OR YOUR PETS?: NO

## 2024-03-05 SDOH — SOCIAL STABILITY: SOCIAL INSECURITY: DO YOU FEEL ANYONE HAS EXPLOITED OR TAKEN ADVANTAGE OF YOU FINANCIALLY OR OF YOUR PERSONAL PROPERTY?: NO

## 2024-03-05 SDOH — ECONOMIC STABILITY: HOUSING INSECURITY: DO YOU FEEL UNSAFE GOING BACK TO THE PLACE WHERE YOU ARE LIVING?: NO

## 2024-03-05 SDOH — HEALTH STABILITY: MENTAL HEALTH: WERE YOU ABLE TO COMPLETE ALL THE BEHAVIORAL HEALTH SCREENINGS?: YES

## 2024-03-05 SDOH — SOCIAL STABILITY: SOCIAL INSECURITY: HAVE YOU HAD THOUGHTS OF HARMING ANYONE ELSE?: NO

## 2024-03-05 SDOH — HEALTH STABILITY: MENTAL HEALTH: HAVE YOU USED ANY SUBSTANCES (CANABIS, COCAINE, HEROIN, HALLUCINOGENS, INHALANTS, ETC.) IN THE PAST 12 MONTHS?: NO

## 2024-03-05 SDOH — HEALTH STABILITY: MENTAL HEALTH: WISH TO BE DEAD (PAST 1 MONTH): NO

## 2024-03-05 SDOH — HEALTH STABILITY: MENTAL HEALTH: NON-SPECIFIC ACTIVE SUICIDAL THOUGHTS (PAST 1 MONTH): NO

## 2024-03-05 SDOH — SOCIAL STABILITY: SOCIAL INSECURITY: VERBAL ABUSE: DENIES

## 2024-03-05 SDOH — HEALTH STABILITY: MENTAL HEALTH: HAVE YOU USED ANY PRESCRIPTION DRUGS OTHER THAN PRESCRIBED IN THE PAST 12 MONTHS?: NO

## 2024-03-05 SDOH — SOCIAL STABILITY: SOCIAL INSECURITY: ABUSE SCREEN: ADULT

## 2024-03-05 SDOH — SOCIAL STABILITY: SOCIAL INSECURITY: ARE THERE ANY APPARENT SIGNS OF INJURIES/BEHAVIORS THAT COULD BE RELATED TO ABUSE/NEGLECT?: NO

## 2024-03-05 SDOH — HEALTH STABILITY: MENTAL HEALTH: CURRENT SMOKER: 0

## 2024-03-05 SDOH — SOCIAL STABILITY: SOCIAL INSECURITY: PHYSICAL ABUSE: DENIES

## 2024-03-05 SDOH — SOCIAL STABILITY: SOCIAL INSECURITY: ARE YOU OR HAVE YOU BEEN THREATENED OR ABUSED PHYSICALLY, EMOTIONALLY, OR SEXUALLY BY ANYONE?: NO

## 2024-03-05 SDOH — HEALTH STABILITY: MENTAL HEALTH: SUICIDAL BEHAVIOR (LIFETIME): NO

## 2024-03-05 ASSESSMENT — LIFESTYLE VARIABLES
AUDIT-C TOTAL SCORE: 0
AUDIT-C TOTAL SCORE: 0
HOW MANY STANDARD DRINKS CONTAINING ALCOHOL DO YOU HAVE ON A TYPICAL DAY: PATIENT DOES NOT DRINK
HOW MANY STANDARD DRINKS CONTAINING ALCOHOL DO YOU HAVE ON A TYPICAL DAY: PATIENT DOES NOT DRINK
AUDIT-C TOTAL SCORE: 0
SKIP TO QUESTIONS 9-10: 1
HOW OFTEN DO YOU HAVE 6 OR MORE DRINKS ON ONE OCCASION: NEVER
HOW OFTEN DO YOU HAVE A DRINK CONTAINING ALCOHOL: NEVER
SKIP TO QUESTIONS 9-10: 1
HOW OFTEN DO YOU HAVE A DRINK CONTAINING ALCOHOL: NEVER
HOW OFTEN DO YOU HAVE 6 OR MORE DRINKS ON ONE OCCASION: NEVER
AUDIT-C TOTAL SCORE: 0

## 2024-03-05 ASSESSMENT — PATIENT HEALTH QUESTIONNAIRE - PHQ9
2. FEELING DOWN, DEPRESSED OR HOPELESS: NOT AT ALL
1. LITTLE INTEREST OR PLEASURE IN DOING THINGS: NOT AT ALL
1. LITTLE INTEREST OR PLEASURE IN DOING THINGS: NOT AT ALL
SUM OF ALL RESPONSES TO PHQ9 QUESTIONS 1 & 2: 0
SUM OF ALL RESPONSES TO PHQ9 QUESTIONS 1 & 2: 0
2. FEELING DOWN, DEPRESSED OR HOPELESS: NOT AT ALL

## 2024-03-05 ASSESSMENT — PAIN SCALES - GENERAL
PAINLEVEL_OUTOF10: 6
PAINLEVEL: 0 - NO PAIN

## 2024-03-05 NOTE — ANESTHESIA PREPROCEDURE EVALUATION
Patient: Briana Phelps    Evaluation Method: In-person visit    Procedure Information    Date: 03/05/24  Procedure: Labor Consult         Relevant Problems   Cardiovascular (within normal limits)      Endocrine (within normal limits)      GI   (+) Gastroesophageal reflux disease      /Renal (within normal limits)      Neuro/Psych (within normal limits)      Pulmonary (within normal limits)      GI/Hepatic (within normal limits)      Hematology   (+) Anemia affecting pregnancy in third trimester      Musculoskeletal (within normal limits)      Eyes, Ears, Nose, and Throat (within normal limits)      Infectious Disease (within normal limits)       Clinical information reviewed:   Tobacco  Allergies  Meds   Med Hx  Surg Hx   Fam Hx  Soc Hx        NPO Detail:  No data recorded     OB/Gyn Evaluation    Present Pregnancy    Patient is pregnant now.   Obstetric History            Physical Exam    Airway  Mallampati: I  TM distance: >3 FB  Neck ROM: full     Cardiovascular - normal exam     Dental - normal exam     Pulmonary - normal exam     Abdominal      Other findings: Pregnant abdomen      Anesthesia Plan    History of general anesthesia?: no  History of complications of general anesthesia?: no    ASA 2     epidural     The patient is not a current smoker.  Patient was not previously instructed to abstain from smoking on day of procedure.  Patient did not smoke on day of procedure.    Anesthetic plan and risks discussed with patient.

## 2024-03-05 NOTE — PROGRESS NOTES
Intrapartum Progress Note    Assessment/Plan   Briana Phelps is a 28 y.o.  at 39w2d. SEGUNDO: 3/10/2024, by Last Menstrual Period.     Second Cytotec placed.  Cervix is softer still essentially fingertip seems to be 80% effaced.  Fetal heart rate is still category 1.  Anticipate vaginal delivery    Principal Problem:    39 weeks gestation of pregnancy  Active Problems:    Gastroesophageal reflux disease    Pregnancy Problems (from 23 to present)       Problem Noted Resolved    39 weeks gestation of pregnancy 3/5/2024 by Paul Ferreira MD No    Priority:  Medium      First pregnancy, third trimester 10/3/2023 by Christian Ayala MD No    Priority:  Medium      Anemia affecting pregnancy in third trimester 10/3/2023 by Chirstian Ayala MD No    Priority:  Medium      Overview Addendum 2023  4:38 PM by Christian Ayala MD     Hgb 10.1 with MCV of 88 and ferritin 48  Patient denies sickle cell  Normal hemoglobin electrophoresis  Followed by hematology         38 weeks gestation of pregnancy 10/3/2023 by Christian Ayala MD No    Priority:  Medium      Overview Addendum 2024  1:23 PM by Julien Olson MD     --[facility]: Normal Pap,  GC and Chlamydia negative,   --Patient started on ASA 2 daily on October 3  --ULTRASOUND: Normal anatomy scan on ;  at 36 wks, 2.922 kg, 46%;  -- ANEMIA: Hgb 10.1, MCV 88, ferritin 48, saw hematology 10/9, will start iron (nl Hgb electrophoresis);  Hgb 10.6, patient unable to tolerate iron, did have ferritin of 48.  Heme is doing a B12 shot. They do not recommend infusion. We did discuss possibility of transfusion if patient has pph. She is agreeable to blood products.  -- Normal 50 g  IOL scheduled on 3/5.                 Subjective   Not having any pain occasional contractions    Objective   Last Vitals:  Temp Pulse Resp BP MAP Pulse Ox   36.4 °C (97.5 °F) 97 16 115/62   98 %     Vitals Min/Max Last 24  Hours:  Temp  Min: 36 °C (96.8 °F)  Max: 36.4 °C (97.5 °F)  Pulse  Min: 90  Max: 98  Resp  Min: 16  Max: 16  BP  Min: 115/62  Max: 135/75    Intake/Output:  No intake or output data in the 24 hours ending 03/05/24 1347    Physical Examination:  FHR is  , with Accelerations, and a Category I tracing.    CERVIX:  Cervix fingertip soft 80% intact vertex -1 station ; MEMBRANES are Intact    Lab Review:  Labs in chart were reviewed.

## 2024-03-05 NOTE — H&P
Obstetrical Admission History and Physical     Briana Phelps is a 28 y.o.  at 39w2d. SEGUNDO: 3/10/2024, by Last Menstrual Period. Estimated fetal weight: 8 pounds. She has had prenatal care with Dr Olson .    Chief Complaint: Scheduled Induction          Anemia    Assessment/Plan    Patient for elective induction at term.  Currently 39 weeks gestational age.  Reviewed options for induction with patient.  Current exam does not allow for balloon.  Will start with Cytotec and placed balloon when possible.  Prenatal records reviewed.  Patient does have history Recommended IV iron transfusions due to anemia. Patient was evaluated by hematology who recommended against iron infusion  Patient currently is planning on vaginal delivery and epidural    Principal Problem:    39 weeks gestation of pregnancy      Pregnancy Problems (from 23 to present)       Problem Noted Resolved    39 weeks gestation of pregnancy 3/5/2024 by Paul Ferreira MD No    Priority:  Medium      First pregnancy, third trimester 10/3/2023 by Christian Ayala MD No    Priority:  Medium      Anemia affecting pregnancy in third trimester 10/3/2023 by Christian Ayala MD No    Priority:  Medium      Overview Addendum 2023  4:38 PM by Christian Ayala MD     Hgb 10.1 with MCV of 88 and ferritin 48  Patient denies sickle cell  Normal hemoglobin electrophoresis  Followed by hematology         38 weeks gestation of pregnancy 10/3/2023 by Christian Ayala MD No    Priority:  Medium      Overview Addendum 2024  1:23 PM by Julien Olson MD     --Labs at [facility] 2023: Normal Pap,  GC and Chlamydia negative,   --Patient started on ASA 2 daily on October 3  --ULTRASOUND: Normal anatomy scan on ;  at 36 wks, 2.922 kg, 46%;  -- ANEMIA: Hgb 10.1, MCV 88, ferritin 48, saw hematology 10/9, will start iron (nl Hgb electrophoresis);  Hgb 10.6, patient unable to tolerate iron, did have ferritin of 48.   Heme is doing a B12 shot. They do not recommend infusion. We did discuss possibility of transfusion if patient has pph. She is agreeable to blood products.  -- Normal 50 g  IOL scheduled on 3/5.               Options for delivery have been discussed with the patient and she elects for an induction of labor.  Cervical ripening with cytotec, cervidil, other prostaglandin agents has been discussed.  Induction of labor with pitocin, amniotomy, cytotec, and cervical balloon have been discussed in detail. The risks, benefits, complications, alternatives, expected outcomes, potential problems during recuperation and recovery, and the risks of not performing the procedure were discussed with the patient. The patient stated understanding that the risks of delivery include, but are not limited to: death; reaction to medications; injury to bowel, bladder, ureters, uterus, cervix, vagina, and other pelvic and abdominal structures, infection; blood loss and possible need for transfusion; and potential need for surgery, including hysterectomy. The risks of injury to the infant during delivery were also discussed. All questions were answered. There was concurrence with the planned procedure, and the patient wanted to proceed.    Admit to inpatient status. I anticipate that this patient will require a stay exceeding at least 2 midnights for delivery and postpartum.  Induction of labor.  Management of pregnancy complications, as indicated.    Subjective   Good fetal movement. Denies vaginal bleeding., C/O of occasional contractions., Denies leaking of fluid.       Reason for Induction of Labor:  Pregnancy at 39 weeks or greater for induction     Obstetrical History   OB History    Para Term  AB Living   1 0 0 0 0 0   SAB IAB Ectopic Multiple Live Births   0 0 0   0      # Outcome Date GA Lbr [place]/2nd Weight Sex Delivery Anes PTL Lv   1 Current                Past Medical History  Past Medical History:   Diagnosis Date     Nausea/vomiting in pregnancy         Past Surgical History   History reviewed. No pertinent surgical history.    Social History  Social History     Tobacco Use    Smoking status: Never    Smokeless tobacco: Never   Substance Use Topics    Alcohol use: Not Currently     Substance and Sexual Activity   Drug Use Not Currently       Allergies  Patient has no known allergies.     Medications  Medications Prior to Admission   Medication Sig Dispense Refill Last Dose    docusate sodium (Colace) 100 mg capsule Take one tablet two times daily as needed for constipation from iron pills. 60 capsule 5 Past Week    ferrous sulfate (FerrouSuL) 325 (65 Fe) MG tablet Take one tablet by mouth twice a day. Please take with food to prevent upset stomach. 60 tablet 5 Unknown    M-Laurie Plus 27 mg iron- 1 mg tablet Take 1 tablet by mouth once daily.   Past Week    omeprazole (PriLOSEC) 20 mg DR capsule Take 1 capsule (20 mg) by mouth once daily in the morning. Take before meals. Do not crush or chew. 30 capsule 1 Unknown    polyethylene glycol (Miralax) 17 gram/dose powder Mix one capful (17 grams) of powder into 12 ounces of water or juice and take by mouth twice daily as needed for constipation 595 g 2 Unknown    prenatal vit,jose d 74-iron-folic (Prenatal Low Iron) 27 mg iron- 1 mg tablet Take by mouth.   Unknown    pyridoxine (Vitamin B-6) 100 mg tablet Take 1 tablet (100 mg) by mouth once daily.   Unknown       Objective    Last Vitals  Temp Pulse Resp BP MAP O2 Sat   36 °C (96.8 °F) 90 16 135/75   100 %     Physical Examination  GENERAL: Examination reveals a well developed, well nourished, gravid female in no acute distress. She is alert and cooperative.  LUNGS: clear to auscultation bilaterally  HEART: regular rate and rhythm, S1, S2 normal, no murmur, click, rub or gallop  ABDOMEN: soft, gravid, nontender, nondistended, no abnormal masses, no epigastric pain, FHT present  The fetus is in a vertex presentation, determined by  ultrasound  Current Estimated Fetal Weight 8 # established by Leopold's maneuver  CERVIX:  Cervix fingertip 50% effaced medium consistency anterior vertex presenting intact -1 station ;     EXTREMITIES: no redness or tenderness in the calves or thighs, no edema  NEUROLOGICAL: DTRs normal and symmetrical  PSYCHOLOGICAL: awake and alert; oriented to person, place, and time    Lab Review  Labs in chart were reviewed.

## 2024-03-06 ENCOUNTER — APPOINTMENT (OUTPATIENT)
Dept: OBSTETRICS AND GYNECOLOGY | Facility: CLINIC | Age: 29
End: 2024-03-06
Payer: MEDICAID

## 2024-03-06 ENCOUNTER — ANESTHESIA EVENT (OUTPATIENT)
Dept: OBSTETRICS AND GYNECOLOGY | Facility: HOSPITAL | Age: 29
End: 2024-03-06
Payer: MEDICAID

## 2024-03-06 ENCOUNTER — ANESTHESIA (OUTPATIENT)
Dept: OBSTETRICS AND GYNECOLOGY | Facility: HOSPITAL | Age: 29
End: 2024-03-06
Payer: MEDICAID

## 2024-03-06 PROCEDURE — 59409 OBSTETRICAL CARE: CPT | Performed by: ADVANCED PRACTICE MIDWIFE

## 2024-03-06 PROCEDURE — 59050 FETAL MONITOR W/REPORT: CPT

## 2024-03-06 PROCEDURE — 01967 NEURAXL LBR ANES VAG DLVR: CPT | Performed by: NURSE ANESTHETIST, CERTIFIED REGISTERED

## 2024-03-06 PROCEDURE — 1100000001 HC PRIVATE ROOM DAILY

## 2024-03-06 PROCEDURE — 2500000001 HC RX 250 WO HCPCS SELF ADMINISTERED DRUGS (ALT 637 FOR MEDICARE OP): Performed by: ADVANCED PRACTICE MIDWIFE

## 2024-03-06 PROCEDURE — 2500000004 HC RX 250 GENERAL PHARMACY W/ HCPCS (ALT 636 FOR OP/ED): Performed by: STUDENT IN AN ORGANIZED HEALTH CARE EDUCATION/TRAINING PROGRAM

## 2024-03-06 PROCEDURE — 51701 INSERT BLADDER CATHETER: CPT

## 2024-03-06 PROCEDURE — 7100000016 HC LABOR RECOVERY PER HOUR

## 2024-03-06 PROCEDURE — 7210000002 HC LABOR PER HOUR

## 2024-03-06 PROCEDURE — 2500000004 HC RX 250 GENERAL PHARMACY W/ HCPCS (ALT 636 FOR OP/ED): Performed by: OBSTETRICS & GYNECOLOGY

## 2024-03-06 PROCEDURE — 2500000004 HC RX 250 GENERAL PHARMACY W/ HCPCS (ALT 636 FOR OP/ED): Performed by: ADVANCED PRACTICE MIDWIFE

## 2024-03-06 RX ORDER — TRANEXAMIC ACID 100 MG/ML
1000 INJECTION, SOLUTION INTRAVENOUS ONCE AS NEEDED
Status: DISCONTINUED | OUTPATIENT
Start: 2024-03-06 | End: 2024-03-08 | Stop reason: HOSPADM

## 2024-03-06 RX ORDER — ACETAMINOPHEN 325 MG/1
975 TABLET ORAL EVERY 6 HOURS
Status: DISCONTINUED | OUTPATIENT
Start: 2024-03-06 | End: 2024-03-08 | Stop reason: HOSPADM

## 2024-03-06 RX ORDER — OXYTOCIN 10 [USP'U]/ML
10 INJECTION, SOLUTION INTRAMUSCULAR; INTRAVENOUS ONCE AS NEEDED
Status: DISCONTINUED | OUTPATIENT
Start: 2024-03-06 | End: 2024-03-06

## 2024-03-06 RX ORDER — ADHESIVE BANDAGE
10 BANDAGE TOPICAL
Status: DISCONTINUED | OUTPATIENT
Start: 2024-03-06 | End: 2024-03-08 | Stop reason: HOSPADM

## 2024-03-06 RX ORDER — MORPHINE SULFATE 4 MG/ML
2 INJECTION INTRAVENOUS ONCE
Status: COMPLETED | OUTPATIENT
Start: 2024-03-06 | End: 2024-03-06

## 2024-03-06 RX ORDER — OXYTOCIN/0.9 % SODIUM CHLORIDE 30/500 ML
60 PLASTIC BAG, INJECTION (ML) INTRAVENOUS ONCE AS NEEDED
Status: DISCONTINUED | OUTPATIENT
Start: 2024-03-06 | End: 2024-03-06

## 2024-03-06 RX ORDER — IBUPROFEN 600 MG/1
600 TABLET ORAL EVERY 6 HOURS
Status: DISCONTINUED | OUTPATIENT
Start: 2024-03-06 | End: 2024-03-08 | Stop reason: HOSPADM

## 2024-03-06 RX ORDER — FENTANYL/ROPIVACAINE/NS/PF 2MCG/ML-.2
0-25 PLASTIC BAG, INJECTION (ML) INJECTION CONTINUOUS
Status: DISCONTINUED | OUTPATIENT
Start: 2024-03-06 | End: 2024-03-08 | Stop reason: HOSPADM

## 2024-03-06 RX ORDER — ONDANSETRON HYDROCHLORIDE 2 MG/ML
4 INJECTION, SOLUTION INTRAVENOUS EVERY 6 HOURS PRN
Status: DISCONTINUED | OUTPATIENT
Start: 2024-03-06 | End: 2024-03-08 | Stop reason: HOSPADM

## 2024-03-06 RX ORDER — BISACODYL 10 MG/1
10 SUPPOSITORY RECTAL DAILY PRN
Status: DISCONTINUED | OUTPATIENT
Start: 2024-03-06 | End: 2024-03-08 | Stop reason: HOSPADM

## 2024-03-06 RX ORDER — ONDANSETRON 4 MG/1
4 TABLET, FILM COATED ORAL EVERY 6 HOURS PRN
Status: DISCONTINUED | OUTPATIENT
Start: 2024-03-06 | End: 2024-03-08 | Stop reason: HOSPADM

## 2024-03-06 RX ORDER — CARBOPROST TROMETHAMINE 250 UG/ML
250 INJECTION, SOLUTION INTRAMUSCULAR ONCE AS NEEDED
Status: DISCONTINUED | OUTPATIENT
Start: 2024-03-06 | End: 2024-03-06

## 2024-03-06 RX ORDER — LIDOCAINE 560 MG/1
1 PATCH PERCUTANEOUS; TOPICAL; TRANSDERMAL
Status: DISCONTINUED | OUTPATIENT
Start: 2024-03-06 | End: 2024-03-08 | Stop reason: HOSPADM

## 2024-03-06 RX ORDER — TRANEXAMIC ACID 100 MG/ML
1000 INJECTION, SOLUTION INTRAVENOUS ONCE AS NEEDED
Status: DISCONTINUED | OUTPATIENT
Start: 2024-03-06 | End: 2024-03-06

## 2024-03-06 RX ORDER — METHYLERGONOVINE MALEATE 0.2 MG/ML
0.2 INJECTION INTRAVENOUS ONCE AS NEEDED
Status: DISCONTINUED | OUTPATIENT
Start: 2024-03-06 | End: 2024-03-08 | Stop reason: HOSPADM

## 2024-03-06 RX ORDER — OXYTOCIN/0.9 % SODIUM CHLORIDE 30/500 ML
60 PLASTIC BAG, INJECTION (ML) INTRAVENOUS ONCE AS NEEDED
Status: DISCONTINUED | OUTPATIENT
Start: 2024-03-06 | End: 2024-03-08 | Stop reason: HOSPADM

## 2024-03-06 RX ORDER — CARBOPROST TROMETHAMINE 250 UG/ML
250 INJECTION, SOLUTION INTRAMUSCULAR ONCE AS NEEDED
Status: DISCONTINUED | OUTPATIENT
Start: 2024-03-06 | End: 2024-03-08 | Stop reason: HOSPADM

## 2024-03-06 RX ORDER — MISOPROSTOL 200 UG/1
800 TABLET ORAL ONCE AS NEEDED
Status: DISCONTINUED | OUTPATIENT
Start: 2024-03-06 | End: 2024-03-08 | Stop reason: HOSPADM

## 2024-03-06 RX ORDER — DIPHENHYDRAMINE HYDROCHLORIDE 50 MG/ML
25 INJECTION INTRAMUSCULAR; INTRAVENOUS EVERY 6 HOURS PRN
Status: DISCONTINUED | OUTPATIENT
Start: 2024-03-06 | End: 2024-03-08 | Stop reason: HOSPADM

## 2024-03-06 RX ORDER — METHYLERGONOVINE MALEATE 0.2 MG/ML
0.2 INJECTION INTRAVENOUS ONCE AS NEEDED
Status: DISCONTINUED | OUTPATIENT
Start: 2024-03-06 | End: 2024-03-06

## 2024-03-06 RX ORDER — LIDOCAINE HYDROCHLORIDE 10 MG/ML
30 INJECTION INFILTRATION; PERINEURAL ONCE AS NEEDED
Status: DISCONTINUED | OUTPATIENT
Start: 2024-03-06 | End: 2024-03-06

## 2024-03-06 RX ORDER — HYDRALAZINE HYDROCHLORIDE 20 MG/ML
5 INJECTION INTRAMUSCULAR; INTRAVENOUS ONCE AS NEEDED
Status: DISCONTINUED | OUTPATIENT
Start: 2024-03-06 | End: 2024-03-08 | Stop reason: HOSPADM

## 2024-03-06 RX ORDER — OXYTOCIN 10 [USP'U]/ML
10 INJECTION, SOLUTION INTRAMUSCULAR; INTRAVENOUS ONCE AS NEEDED
Status: DISCONTINUED | OUTPATIENT
Start: 2024-03-06 | End: 2024-03-08 | Stop reason: HOSPADM

## 2024-03-06 RX ORDER — LOPERAMIDE HYDROCHLORIDE 2 MG/1
4 CAPSULE ORAL EVERY 2 HOUR PRN
Status: DISCONTINUED | OUTPATIENT
Start: 2024-03-06 | End: 2024-03-08 | Stop reason: HOSPADM

## 2024-03-06 RX ORDER — POLYETHYLENE GLYCOL 3350 17 G/17G
17 POWDER, FOR SOLUTION ORAL 2 TIMES DAILY PRN
Status: DISCONTINUED | OUTPATIENT
Start: 2024-03-06 | End: 2024-03-08 | Stop reason: HOSPADM

## 2024-03-06 RX ORDER — DIPHENHYDRAMINE HCL 25 MG
25 CAPSULE ORAL EVERY 6 HOURS PRN
Status: DISCONTINUED | OUTPATIENT
Start: 2024-03-06 | End: 2024-03-08 | Stop reason: HOSPADM

## 2024-03-06 RX ORDER — OXYTOCIN/0.9 % SODIUM CHLORIDE 30/500 ML
2-30 PLASTIC BAG, INJECTION (ML) INTRAVENOUS CONTINUOUS
Status: DISCONTINUED | OUTPATIENT
Start: 2024-03-06 | End: 2024-03-08 | Stop reason: HOSPADM

## 2024-03-06 RX ORDER — MISOPROSTOL 200 UG/1
800 TABLET ORAL ONCE AS NEEDED
Status: DISCONTINUED | OUTPATIENT
Start: 2024-03-06 | End: 2024-03-06

## 2024-03-06 RX ORDER — LABETALOL HYDROCHLORIDE 5 MG/ML
20 INJECTION, SOLUTION INTRAVENOUS ONCE AS NEEDED
Status: DISCONTINUED | OUTPATIENT
Start: 2024-03-06 | End: 2024-03-08 | Stop reason: HOSPADM

## 2024-03-06 RX ORDER — NIFEDIPINE 10 MG/1
10 CAPSULE ORAL ONCE AS NEEDED
Status: DISCONTINUED | OUTPATIENT
Start: 2024-03-06 | End: 2024-03-08 | Stop reason: HOSPADM

## 2024-03-06 RX ORDER — SIMETHICONE 80 MG
80 TABLET,CHEWABLE ORAL 4 TIMES DAILY PRN
Status: DISCONTINUED | OUTPATIENT
Start: 2024-03-06 | End: 2024-03-08 | Stop reason: HOSPADM

## 2024-03-06 RX ORDER — LOPERAMIDE HYDROCHLORIDE 2 MG/1
4 CAPSULE ORAL EVERY 2 HOUR PRN
Status: DISCONTINUED | OUTPATIENT
Start: 2024-03-06 | End: 2024-03-06

## 2024-03-06 RX ADMIN — IBUPROFEN 600 MG: 600 TABLET ORAL at 23:38

## 2024-03-06 RX ADMIN — ACETAMINOPHEN 975 MG: 325 TABLET ORAL at 23:38

## 2024-03-06 RX ADMIN — IBUPROFEN 600 MG: 600 TABLET ORAL at 17:31

## 2024-03-06 RX ADMIN — Medication 4 ML: at 09:30

## 2024-03-06 RX ADMIN — Medication 4 ML: at 09:27

## 2024-03-06 RX ADMIN — MORPHINE SULFATE 2 MG: 4 INJECTION INTRAVENOUS at 08:34

## 2024-03-06 RX ADMIN — SODIUM CHLORIDE, POTASSIUM CHLORIDE, SODIUM LACTATE AND CALCIUM CHLORIDE 125 ML/HR: 600; 310; 30; 20 INJECTION, SOLUTION INTRAVENOUS at 02:30

## 2024-03-06 RX ADMIN — SODIUM CHLORIDE, POTASSIUM CHLORIDE, SODIUM LACTATE AND CALCIUM CHLORIDE 125 ML/HR: 600; 310; 30; 20 INJECTION, SOLUTION INTRAVENOUS at 12:15

## 2024-03-06 RX ADMIN — SODIUM CHLORIDE, POTASSIUM CHLORIDE, SODIUM LACTATE AND CALCIUM CHLORIDE 125 ML/HR: 600; 310; 30; 20 INJECTION, SOLUTION INTRAVENOUS at 04:22

## 2024-03-06 RX ADMIN — Medication 10 ML/HR: at 09:32

## 2024-03-06 RX ADMIN — SODIUM CHLORIDE, POTASSIUM CHLORIDE, SODIUM LACTATE AND CALCIUM CHLORIDE 500 ML: 600; 310; 30; 20 INJECTION, SOLUTION INTRAVENOUS at 08:45

## 2024-03-06 RX ADMIN — ACETAMINOPHEN 975 MG: 325 TABLET ORAL at 17:31

## 2024-03-06 SDOH — HEALTH STABILITY: MENTAL HEALTH: CURRENT SMOKER: 0

## 2024-03-06 ASSESSMENT — PAIN SCALES - GENERAL
PAINLEVEL_OUTOF10: 0 - NO PAIN
PAINLEVEL_OUTOF10: 9
PAINLEVEL_OUTOF10: 0 - NO PAIN
PAINLEVEL_OUTOF10: 7

## 2024-03-06 ASSESSMENT — PAIN DESCRIPTION - LOCATION: LOCATION: ABDOMEN

## 2024-03-06 ASSESSMENT — PAIN - FUNCTIONAL ASSESSMENT: PAIN_FUNCTIONAL_ASSESSMENT: 0-10

## 2024-03-06 NOTE — SIGNIFICANT EVENT
Patient reporting rectal pressure, consents to cervical exam.   CE: 10/100/0-+1  FHR: 135, moderate, +accels, +late deceleration x2, one with position change and CE  [place]: q 2-3 mins     PLAN:   -begin maternal pushing efforts  -cEFM  -anticipate     JENNIFER VALENTINE[facility]

## 2024-03-06 NOTE — ANESTHESIA PROCEDURE NOTES
Epidural Block    Patient location during procedure: OB  Start time: 3/6/2024 9:08 AM  Reason for block: labor analgesia  Staffing  Performed: CRNA   Authorized by: SHRUTHI Mendieta    Performed by: SHRUTHI Mendieta    Preanesthetic Checklist  Completed: patient identified, IV checked, risks and benefits discussed, surgical consent, pre-op evaluation, timeout performed and sterile techniques followed  Block Timeout  RN/Licensed healthcare professional reads aloud to the Anesthesia provider and entire team: Patient identity, procedure with side and site, patient position, and as applicable the availability of implants/special equipment/special requirements.  Patient on coagulant treatment: no  Timeout performed at: 3/6/2024 9:05 AM  Block Placement  Patient position: sitting  Prep: ChloraPrep  Sterility prep: gloves, mask, drape and cap  Sedation level: no sedation  Patient monitoring: blood pressure and continuous pulse oximetry  Approach: midline  Local numbing: lidocaine 1% to skin and subcutaneous tissues  Vertebral space: lumbar  Lumbar location: L3-L4  Epidural  Loss of resistance technique: saline  Guidance: landmark technique        Needle  Needle type: Tuohy   Needle gauge: 17  Needle length: 10 cm  Needle insertion depth: 6 cm  Catheter type: multi-orifice  Catheter size: 19 G  Catheter at skin depth: 12 cm  Catheter securement method: clear occlusive dressing, surgical tape and stabilization device    Test dose: lidocaine 1.5% with epinephrine 1-to-200,000  Test dose: lidocaine 1.5% with epinephrine 1-to-200,000  Test dose result: no positive test dose    PCEA  Medication concentration used: 0.2% Ropivacaine with 2 mcg/mL Fentanyl  Dose (mL): 5  Lockout (minutes): 30  1-Hour Limit (boluses/hr): 2  Basal Rate: 10        Assessment  Block outcome: patient comfortable  Number of attempts: 1  Events: no positive test dose  Procedure assessment: patient tolerated procedure well with no  immediate complications

## 2024-03-06 NOTE — PROGRESS NOTES
[facility] PROGRESS NOTE    Subjective:    Patient is doing well with contractions.   Partner is supportive and present at the bedside.    Contraction pain is mild and moderate,   Epidural:  planning NCB    Objective:   Visit Vitals  /77   Pulse 79   Temp 36.6 °C (97.9 °F)   Resp 16       FHR:      Baseline 135  Variability; moderate  Accels; Reactive  Decels none                                      Contraction Frequency:  irregular, every 3-6 minutes    Cervical Exam: 1 cm, 60%, -3 station, midposition, medium    Membranes:  are intact    Pitocin:  No      Assessment:  Briana Phelps is a 28 y.o.  at 39w2d  Scheduled IOL, s/p cytotec x3  Hx of anemia - 10.8/ 34 on admission has been receiving b12 injections  Cervical ripening portion of Labor  Fetal Heart Rate Category 1 - overall reassuring  GBS: neg    Plan:  Cytotec #4 placed in posterior fornix without difficulty, patient tolerated well  Plan for CFB with next exam  Patient and family encouraged to sleep at this note time.   Activity as tolerated  Clear liquids  Encouraged to rest  Frequent position changes  Continuous fetal monitoring  Re-evaluate in 4 hours or as needed - as patient encouraged to rest.     Dr. Tanner is aware of plan of care and remains in house and available PRN    Electronically signed by HOSSEIN Kimbrough on 3/5/2024 at 11:13 PM

## 2024-03-06 NOTE — PROGRESS NOTES
[facility] PROGRESS NOTE    Subjective:    Patient is doing well with contractions.   Partner and her friend are supportive and present at the bedside.    Contraction pain is moderate and severe,   Epidural:  planning NCB    Objective:   Visit Vitals  /77   Pulse 79   Temp 36.6 °C (97.9 °F) (Temporal)   Resp 17       FHR:      Baseline 140  Variability; moderate  Accels; Reactive  Decels none                                      Contraction Frequency:  irregular, every 2-5 minutes    Cervical Exam: 1-2 cm, 60%, -3 station midposition, soft    Membranes:  are intact    Pitocin:  No      Assessment:  Briana Phelps is a 28 y.o.  at 39w3d  Scheduled IOL, s/p cytotec x4  Hx of anemia - 10.8 on admission has been receiving b12 injections  Cervical ripening portion of Labor  Fetal Heart Rate Category 1 - overall reassuring  GBS: neg    Plan:  60 ml CFB placed without difficulty. Patient tolerated well. Reposiotioned to far right lateral with peanut ball after insertion.   Patient became quite uncomfortable after insertion. Pain management options reviewed to include nitrous and morphine, patient opting for nitrous at this time. Will be brought to bedside shortly.   Consider pitocin augmentation after CFB is expelled PRN  Activity as tolerated  Clear liquids  Encouraged to rest  Frequent position changes  Positioned to far right sidelying position with leg elevated on peanut ball  Continuous fetal monitoring  Re-evaluate in 4 hours or as needed    Reviewed with Dr Tanner who agreed with plan of care    Electronically signed by HOSSEIN Kimbrough on 3/6/2024 at 3:34 AM

## 2024-03-06 NOTE — CARE PLAN
Problem: Postpartum  Goal: Experiences normal postpartum course  Outcome: Progressing  Goal: Appropriate maternal -  bonding  Outcome: Progressing     Problem: Pain - Adult  Goal: Verbalizes/displays adequate comfort level or baseline comfort level  Outcome: Progressing   The patient's goals for the shift include      The clinical goals for the shift include have a baby    Over the shift, the patient did make progress toward the following goals. Barriers to progression include n/a. Recommendations to address these barriers include n/a.

## 2024-03-06 NOTE — PROGRESS NOTES
Intrapartum Progress Note    Assessment/Plan   Briana Phelps is a 28 y.o.  at 39w3d. SEGUNDO: 3/10/2024, by Last Menstrual Period.     ASSESSMENT:  -IUP at 39.3 wks  -cat I FHR  -IOL--> early labor      PLAN  -morphine 2mg IV push once for pain  -cEFM  -will defer pitocin at this point due to patient request and also patient tye regularly and making change without. Will continue to monitor and initiate as indicated.  -reassess in 2-4 hours or sooner prn.   -anticipate     Principal Problem:    39 weeks gestation of pregnancy  Active Problems:    Gastroesophageal reflux disease    Pregnancy Problems (from 23 to present)       Problem Noted Resolved    39 weeks gestation of pregnancy 3/5/2024 by Paul Ferreira MD No    Priority:  Medium      First pregnancy, third trimester 10/3/2023 by Christian Ayala MD No    Priority:  Medium      Anemia affecting pregnancy in third trimester 10/3/2023 by Christian Ayala MD No    Priority:  Medium      Overview Addendum 2023  4:38 PM by Christian Ayala MD     Hgb 10.1 with MCV of 88 and ferritin 48  Patient denies sickle cell  Normal hemoglobin electrophoresis  Followed by hematology         38 weeks gestation of pregnancy 10/3/2023 by Christian Ayala MD No    Priority:  Medium      Overview Addendum 2024  1:23 PM by Julien Olson MD     --[facility]: Normal Pap,  GC and Chlamydia negative,   --Patient started on ASA 2 daily on October 3  --ULTRASOUND: Normal anatomy scan on ;  at 36 wks, 2.922 kg, 46%;  -- ANEMIA: Hgb 10.1, MCV 88, ferritin 48, saw hematology 10/9, will start iron (nl Hgb electrophoresis);  Hgb 10.6, patient unable to tolerate iron, did have ferritin of 48.  Heme is doing a B12 shot. They do not recommend infusion. We did discuss possibility of transfusion if patient has pph. She is agreeable to blood products.  -- Normal 50 g  IOL scheduled on 3/5.                  Subjective   Briana reports feeling more uncomfortable with contractions and is requesting morphine. She declined starting pitocin 1 hour ago as she felt her contractions were becoming more consistent and painful.     Objective   Last Vitals:  Temp Pulse Resp BP MAP Pulse Ox   36.6 °C (97.9 °F) 93 19 137/74   98 %     Vitals Min/Max Last 24 Hours:  Temp  Min: 36 °C (96.8 °F)  Max: 36.7 °C (98.1 °F)  Pulse  Min: 79  Max: 98  Resp  Min: 16  Max: 19  BP  Min: 115/62  Max: 137/74    Intake/Output:    Intake/Output Summary (Last 24 hours) at 3/6/2024 0826  Last data filed at 3/6/2024 0440  Gross per 24 hour   Intake 1095.83 ml   Output --   Net 1095.83 ml       Physical Examination:  GENERAL: Examination reveals a well developed, well nourished, gravid female whose affect is appropriate. She is alert and cooperative.  LUNGS:  unlabored breathing  ABDOMEN: soft, gravid, nontender, nondistended, no abnormal masses, no epigastric pain  FHR is 145, moderate, +accels, -decels  [place] reading:  q2-4 mins  CERVIX: 5 cm dilated, 80 % effaced, -2 station; MEMBRANES are SROM  NEUROLOGICAL: alert, oriented, normal speech, no focal findings or movement disorder noted  PSYCHOLOGICAL: awake and alert; oriented to person, place, and time    Lab Review:  Labs in chart were reviewed.

## 2024-03-06 NOTE — SIGNIFICANT EVENT
S: Patient now comfortable with epidural.   O: FHR: 135, moderate variability with periods of minimal variability, +accels, -decels       [place]: q 3-5 minutes       CE: 8.5/90/-1  A: IUP at 39.3 wks      Cat I FHR      IOL--> active labor  P: Discussed starting pitocin with patient as her contractions have spaced out. She requested a cervical exam first and was found to be 8-9/90/-1. Given significant cervical change since last exam without augmentation, will defer pitocin at this time. If cervix unchanged on next exam, will begin pitocin at that time.        cEFM       Reassess in 1-2 hours or sooner prn.        Anticipate .     MEME Fry-[facility]

## 2024-03-06 NOTE — PROGRESS NOTES
Cytotec #3 placed in the posterior fornix\. Patient tolerated well. Will reassess in 3-4 hours or PRN.

## 2024-03-06 NOTE — ANESTHESIA PREPROCEDURE EVALUATION
Patient: Briana Phelps    Evaluation Method: In-person visit    Procedure Information    Date: 03/05/24  Procedure: Labor Consult         Relevant Problems   Cardiovascular (within normal limits)      Endocrine (within normal limits)      GI   (+) Gastroesophageal reflux disease      /Renal (within normal limits)      Neuro/Psych (within normal limits)      Pulmonary (within normal limits)      GI/Hepatic (within normal limits)      Hematology   (+) Anemia affecting pregnancy in third trimester      Musculoskeletal (within normal limits)      Eyes, Ears, Nose, and Throat (within normal limits)      Infectious Disease (within normal limits)       Clinical information reviewed:   Tobacco  Allergies  Meds  Problems  Med Hx  Surg Hx   Fam Hx  Soc   Hx        NPO Detail:  No data recorded     OB/Gyn Evaluation    Present Pregnancy    Patient is pregnant now.   Obstetric History                Physical Exam    Airway  Mallampati: I  TM distance: >3 FB  Neck ROM: full     Cardiovascular - normal exam     Dental - normal exam     Pulmonary - normal exam     Abdominal      Other findings: Pregnant abdomen        Anesthesia Plan    History of general anesthesia?: no  History of complications of general anesthesia?: no    ASA 2     epidural     The patient is not a current smoker.  Patient was not previously instructed to abstain from smoking on day of procedure.  Patient did not smoke on day of procedure.    Anesthetic plan and risks discussed with patient.

## 2024-03-06 NOTE — PROGRESS NOTES
[facility] PROGRESS NOTE    Subjective:    Patient is doing well with contractions.   Partner is supportive and present at the bedside.    Contraction pain is mild, patient rates in 6/10 she is feeling contractions every 20 minutes.   Epidural:  planning NCB    Objective:   Visit Vitals  /67   Pulse 94   Temp 36.4 °C (97.5 °F) (Temporal)   Resp 16       FHR:      Baseline 140  Variability; moderate  Accels; Reactive  Decels none                                      Contraction Frequency:  irregular, every 1.5-6 minutes    Cervical Exam: 1 cm, 60%, -3 station, firm, very posterior    Membranes:  are intact    Pitocin:  No      Assessment:  Briana Phelps is a 28 y.o.  at 39w2d  Scheduled IOL s/p cytotec x2   Hx of anemia - 10.8 on admission has been receiving b12 injections  Cervical ripening portion of Labor  Fetal Heart Rate Category 1 - overall reassuring  GBS: neg    Plan:  Had been tye too frequently for 3rd dose of cytotec. Iv bolus initiated. Plan to place 3rd dose when contractions space out. Will consider CFB and or pitocin augmentation when able.   Activity as tolerated  Clear liquids  Encouraged to rest  Frequent position changes  Continuous fetal monitoring  Re-evaluate in 2 hours or as needed  Expectant management  Reviewed with Dr Tanner who agreed with plan of care    Electronically signed by HOSSEIN Kimbrough on 3/5/2024 at 7:34 PM

## 2024-03-06 NOTE — PROGRESS NOTES
Called by RN as CFB out @ appx 0430 am. Patient did also SROM at that time for MSF. On arrival to room patient was up to the bathroom. On recheck of cervix she was found to be 4/80/-2.   Her contractions have started to space out, but patient is planning NCB. Pitocin has been ordered. Will start after she has had a little bit of a break. Reviewed with RN. If contractions haven't picked up by 0700 - will start pitocin.   Cat I tracing  Contractions q 6-7  Patient coping well at this time.     Plan for NICU in the room for delivery r/t MSF. Reviewed with patient and her .

## 2024-03-07 PROCEDURE — 2500000001 HC RX 250 WO HCPCS SELF ADMINISTERED DRUGS (ALT 637 FOR MEDICARE OP): Performed by: ADVANCED PRACTICE MIDWIFE

## 2024-03-07 PROCEDURE — 0UQMXZZ REPAIR VULVA, EXTERNAL APPROACH: ICD-10-PCS | Performed by: ADVANCED PRACTICE MIDWIFE

## 2024-03-07 PROCEDURE — 1100000001 HC PRIVATE ROOM DAILY

## 2024-03-07 RX ADMIN — IBUPROFEN 600 MG: 600 TABLET ORAL at 18:10

## 2024-03-07 RX ADMIN — ACETAMINOPHEN 975 MG: 325 TABLET ORAL at 05:38

## 2024-03-07 RX ADMIN — ACETAMINOPHEN 975 MG: 325 TABLET ORAL at 11:25

## 2024-03-07 RX ADMIN — IBUPROFEN 600 MG: 600 TABLET ORAL at 05:38

## 2024-03-07 RX ADMIN — ACETAMINOPHEN 975 MG: 325 TABLET ORAL at 18:10

## 2024-03-07 RX ADMIN — IBUPROFEN 600 MG: 600 TABLET ORAL at 11:25

## 2024-03-07 SDOH — SOCIAL STABILITY: SOCIAL INSECURITY: VERBAL ABUSE: DENIES

## 2024-03-07 SDOH — SOCIAL STABILITY: SOCIAL INSECURITY: DO YOU FEEL ANYONE HAS EXPLOITED OR TAKEN ADVANTAGE OF YOU FINANCIALLY OR OF YOUR PERSONAL PROPERTY?: NO

## 2024-03-07 SDOH — HEALTH STABILITY: MENTAL HEALTH: WISH TO BE DEAD (PAST 1 MONTH): NO

## 2024-03-07 SDOH — HEALTH STABILITY: MENTAL HEALTH: WERE YOU ABLE TO COMPLETE ALL THE BEHAVIORAL HEALTH SCREENINGS?: YES

## 2024-03-07 SDOH — SOCIAL STABILITY: SOCIAL INSECURITY: ABUSE SCREEN: ADULT

## 2024-03-07 SDOH — SOCIAL STABILITY: SOCIAL INSECURITY: HAS ANYONE EVER THREATENED TO HURT YOUR FAMILY OR YOUR PETS?: NO

## 2024-03-07 SDOH — HEALTH STABILITY: MENTAL HEALTH: NON-SPECIFIC ACTIVE SUICIDAL THOUGHTS (PAST 1 MONTH): NO

## 2024-03-07 SDOH — SOCIAL STABILITY: SOCIAL INSECURITY: DOES ANYONE TRY TO KEEP YOU FROM HAVING/CONTACTING OTHER FRIENDS OR DOING THINGS OUTSIDE YOUR HOME?: NO

## 2024-03-07 SDOH — SOCIAL STABILITY: SOCIAL INSECURITY: HAVE YOU HAD THOUGHTS OF HARMING ANYONE ELSE?: NO

## 2024-03-07 SDOH — ECONOMIC STABILITY: HOUSING INSECURITY: DO YOU FEEL UNSAFE GOING BACK TO THE PLACE WHERE YOU ARE LIVING?: NO

## 2024-03-07 SDOH — SOCIAL STABILITY: SOCIAL INSECURITY: PHYSICAL ABUSE: DENIES

## 2024-03-07 SDOH — HEALTH STABILITY: MENTAL HEALTH: SUICIDAL BEHAVIOR (LIFETIME): NO

## 2024-03-07 SDOH — SOCIAL STABILITY: SOCIAL INSECURITY: ARE THERE ANY APPARENT SIGNS OF INJURIES/BEHAVIORS THAT COULD BE RELATED TO ABUSE/NEGLECT?: NO

## 2024-03-07 SDOH — SOCIAL STABILITY: SOCIAL INSECURITY: ARE YOU OR HAVE YOU BEEN THREATENED OR ABUSED PHYSICALLY, EMOTIONALLY, OR SEXUALLY BY ANYONE?: NO

## 2024-03-07 ASSESSMENT — PAIN SCALES - GENERAL
PAINLEVEL_OUTOF10: 0 - NO PAIN
PAINLEVEL_OUTOF10: 6
PAINLEVEL_OUTOF10: 3

## 2024-03-07 ASSESSMENT — PATIENT HEALTH QUESTIONNAIRE - PHQ9
2. FEELING DOWN, DEPRESSED OR HOPELESS: NOT AT ALL
1. LITTLE INTEREST OR PLEASURE IN DOING THINGS: NOT AT ALL
SUM OF ALL RESPONSES TO PHQ9 QUESTIONS 1 & 2: 0

## 2024-03-07 ASSESSMENT — PAIN DESCRIPTION - LOCATION: LOCATION: ABDOMEN

## 2024-03-07 ASSESSMENT — LIFESTYLE VARIABLES
HOW OFTEN DO YOU HAVE 6 OR MORE DRINKS ON ONE OCCASION: NEVER
AUDIT-C TOTAL SCORE: 0
AUDIT-C TOTAL SCORE: 0
SKIP TO QUESTIONS 9-10: 1
HOW OFTEN DO YOU HAVE A DRINK CONTAINING ALCOHOL: NEVER
HOW MANY STANDARD DRINKS CONTAINING ALCOHOL DO YOU HAVE ON A TYPICAL DAY: PATIENT DOES NOT DRINK

## 2024-03-07 ASSESSMENT — PAIN - FUNCTIONAL ASSESSMENT: PAIN_FUNCTIONAL_ASSESSMENT: 0-10

## 2024-03-07 ASSESSMENT — ACTIVITIES OF DAILY LIVING (ADL): LACK_OF_TRANSPORTATION: NO

## 2024-03-07 NOTE — L&D DELIVERY NOTE
OB Delivery Note  3/7/2024  Briana Phelps  28 y.o.   Vaginal, Spontaneous        Gestational Age: 39w3d  /Para:   Quantitative Blood Loss: Admission to Discharge: 135 mL (3/5/2024  8:17 AM - 3/7/2024  5:02 PM)    Larry Phelps [[id number]]      Labor Events    Sac identifier: Sac 1  Rupture date/time: 3/6/2024 0535  Rupture type: Spontaneous  Fluid color: Meconium  Fluid odor: None  Labor type: Induced Onset of Labor  Labor allowed to proceed with plans for an attempted vaginal birth?: Yes  Induction: Misoprostol, [place]/EASI  Complications: None       Labor Event Times    Labor onset date/time: 3/5/2024 0817  Dilation complete date/time: 3/6/2024 1322  Start pushing date/time: 3/6/2024 1345       Labor Length    1st stage: 29h 05m  2nd stage: 1h 30m  3rd stage: 0h 10m       Placenta    Placenta delivery date/time: 3/6/2024 1502  Placenta removal: Spontaneous  Placenta appearance: Intact  Placenta disposition: discarded       Cord    Cord clamped date/time: 3/6/2024 1455  Cord blood disposition: Discarded  Gases sent?: No  Stem cell collection (by provider): No       Lacerations    Episiotomy: None  Perineal laceration: None  Labial laceration?: Yes  Labial laceration location: left  Labial laceration repaired?: Yes  Repair suture: 3-0 Synthetic Suture       Anesthesia    Method: Epidural       Operative Delivery    Forceps attempted?: No  Vacuum extractor attempted?: No       Shoulder Dystocia    Shoulder dystocia present?: No       [place] Delivery    Time head delivered: 3/6/2024 14:52:00  Birth date/time: 3/6/2024 14:52:00  Delivery type: Vaginal, Spontaneous  Complications: None       Resuscitation    Method: Tactile stimulation       Apgars    Living status: Living  Apgar Component Scores:  1 min.:  5 min.:  10 min.:  15 min.:  20 min.:    Skin color:  0  1       Heart rate:  2  2       Reflex irritability:  2  2       Muscle tone:  2  2       Respiratory effort:  2  2       Total:   8  9       Apgars assigned by: JENNIFER BUSCH RN       Delivery Providers    Delivering clinician: Pb Kirby, MEME[facility]   Provider Role    Vicenta Sarmiento, WOO Delivery Nurse    Angelique Busch, RN Nursery Nurse    Remi Robles MD Pediatrician                 Pb Kirby, MEME-SIA

## 2024-03-07 NOTE — LACTATION NOTE
Lactation Consultant Note  Lactation Consultation  Reason for Consult: Initial assessment  Consultant Name: YU Bermeo    Maternal Information  Has mother  before?: No  Infant to breast within first 2 hours of birth?: Yes  Exclusive Pump and Bottle Feed: No    Maternal Assessment  Breast Assessment: Medium, Soft, Compressible  Nipple Assessment: Intact  [place] Assessment: Normal    Infant Assessment  Infant Behavior: Content after feeding    Feeding Assessment  Nutrition Source: Breastmilk  Feeding Method: Nursing at the breast  Feeding Position: Cross - cradle, Laid back    LATCH TOOL       Breast Pump       Other OB Lactation Tools       Patient Follow-up  Inpatient Lactation Follow-up Needed : Yes    Other OB Lactation Documentation       Recommendations/Summary   IN to see pt. Mom finishing up feeding infant at the bedside. Infants latch more shallow and hold is a little  from mom. Mom denies discomfort however at the breast. Mom currently on the phone with sister that is a good breastfeeding support. Infant removed self from breast before able to help with positioning. Discussed hand expression with mom and demonstrated skill.  Encouraged mom to call for next feed and will assist with latch and positioning.  -Initial lactation visit paperwork given. Outpatient flyer discussed. PI forms #197 Nursing your baby,174 is My  baby getting enough,168 ,167 Sore and cracked nipples,123 Tips for pumping,162 tips to increase milk supply,163 Breast fullness and Engorgement,728  Breast Massage with Milk Expression by Hand,165 Returning to work,682 breastfeeding and Birth Control and how to clean breast pumps.

## 2024-03-07 NOTE — ANESTHESIA POSTPROCEDURE EVALUATION
"Patient: Briana Phelps    Procedure Summary       Date: 03/06/24 Room / Location:     Anesthesia Start: 0908 Anesthesia Stop: 1452    Procedure: Labor Analgesia Diagnosis:     Scheduled Providers:  Responsible Provider: SHRUTHI Mendieta    Anesthesia Type: epidural ASA Status: 2            Anesthesia Type: epidural    /83   Pulse 77   Temp 36.5 °C (97.7 °F) (Temporal)   Resp 18   Ht 1.676 m (5' 5.98\")   Wt 90.4 kg (199 lb 3 oz)   LMP 06/04/2023 (Exact Date)   SpO2 98%   Breastfeeding Yes   BMI 32.16 kg/m²        Anesthesia Post Evaluation    Patient location during evaluation: bedside  Patient participation: complete - patient participated  Level of consciousness: awake and alert  Pain management: adequate  Airway patency: patent  Cardiovascular status: acceptable  Respiratory status: acceptable  Hydration status: acceptable  Postoperative Nausea and Vomiting: none  Comments: Epidural catheter removed by nursing. No redness, swelling, or drainage at puncture site.    Complete resolution of numbness. Patient is able to lift legs, bend at the knees, and ambulate.    Patient denies problems with urination.    Patient denies nausea, headache or severe back pain.       There were no known notable events for this encounter.    "

## 2024-03-07 NOTE — PROGRESS NOTES
Postpartum Progress Note    Assessment/Plan   Briana Phelps is a 28 y.o., , who delivered at 39w3d gestation and is now postpartum day 1.    Planning discharge tomorrow if patient remains stable.     Principal Problem:    39 weeks gestation of pregnancy  Active Problems:    Gastroesophageal reflux disease    Pregnancy Problems (from 23 to present)       Problem Noted Resolved    39 weeks gestation of pregnancy 3/5/2024 by Paul Ferreira MD No    Priority:  Medium      First pregnancy, third trimester 10/3/2023 by Christian Ayala MD No    Priority:  Medium      Anemia affecting pregnancy in third trimester 10/3/2023 by Christian Ayala MD No    Priority:  Medium      Overview Addendum 2023  4:38 PM by Christian Ayala MD     Hgb 10.1 with MCV of 88 and ferritin 48  Patient denies sickle cell  Normal hemoglobin electrophoresis  Followed by hematology         38 weeks gestation of pregnancy 10/3/2023 by Christian Ayala MD No    Priority:  Medium      Overview Addendum 2024  1:23 PM by Julien Olson MD     --Labs at [facility] 2023: Normal Pap,  GC and Chlamydia negative,   --Patient started on ASA 2 daily on October 3  --ULTRASOUND: Normal anatomy scan on ;  at 36 wks, 2.922 kg, 46%;  -- ANEMIA: Hgb 10.1, MCV 88, ferritin 48, saw hematology 10/9, will start iron (nl Hgb electrophoresis);  Hgb 10.6, patient unable to tolerate iron, did have ferritin of 48.  Heme is doing a B12 shot. They do not recommend infusion. We did discuss possibility of transfusion if patient has pph. She is agreeable to blood products.  -- Normal 50 g  IOL scheduled on 3/5.               Hospital course: no complications      Subjective   Her pain is well controlled with current medications  She is passing flatus  She is ambulating well  She is tolerating a Adult diet Regular  She reports no breast or nursing problems  She denies emotional concerns today   Her plan for  contraception is none     Patient doing well at this note time.     Objective   Allergies:   Patient has no known allergies.         Last Vitals:  Temp Pulse Resp BP MAP Pulse Ox   36.5 °C (97.7 °F) 77 18 127/83   98 %     Vitals Min/Max Last 24 Hours:  Temp  Min: 36.1 °C (97 °F)  Max: 37.3 °C (99.1 °F)  Pulse  Min: 72  Max: 117  Resp  Min: 16  Max: 20  BP  Min: 110/57  Max: 148/81    Intake/Output:     Intake/Output Summary (Last 24 hours) at 3/7/2024 0838  Last data filed at 3/6/2024 1800  Gross per 24 hour   Intake 1000 ml   Output 935 ml   Net 65 ml       Physical Exam:  General: Examination reveals a well developed, well nourished, female, in no acute distress. She is alert and cooperative.  Breasts: breasts appear normal for pregnancy, no suspicious masses, no skin or nipple changes or axillary nodes.  Abdomen: soft, gravid, nontender, nondistended, no abnormal masses, no epigastric pain.  Fundus: firm, at umbilicus, and nontender.  Perineum: well approximated.  Psychological: awake and alert; oriented to person, place, and time.    Lab Data:  Labs in chart were reviewed.

## 2024-03-08 VITALS
BODY MASS INDEX: 32.01 KG/M2 | WEIGHT: 199.19 LBS | RESPIRATION RATE: 16 BRPM | HEART RATE: 75 BPM | TEMPERATURE: 98.4 F | DIASTOLIC BLOOD PRESSURE: 77 MMHG | SYSTOLIC BLOOD PRESSURE: 124 MMHG | HEIGHT: 66 IN | OXYGEN SATURATION: 97 %

## 2024-03-08 PROCEDURE — 2500000001 HC RX 250 WO HCPCS SELF ADMINISTERED DRUGS (ALT 637 FOR MEDICARE OP): Performed by: ADVANCED PRACTICE MIDWIFE

## 2024-03-08 RX ORDER — ACETAMINOPHEN 500 MG
1000 TABLET ORAL EVERY 6 HOURS PRN
Qty: 30 TABLET | Refills: 1 | Status: SHIPPED | OUTPATIENT
Start: 2024-03-08 | End: 2024-03-18

## 2024-03-08 RX ORDER — IBUPROFEN 800 MG/1
800 TABLET ORAL EVERY 6 HOURS PRN
Qty: 30 TABLET | Refills: 0 | Status: SHIPPED | OUTPATIENT
Start: 2024-03-08 | End: 2024-03-18

## 2024-03-08 RX ADMIN — ACETAMINOPHEN 975 MG: 325 TABLET ORAL at 00:01

## 2024-03-08 RX ADMIN — IBUPROFEN 600 MG: 600 TABLET ORAL at 00:01

## 2024-03-08 RX ADMIN — ACETAMINOPHEN 975 MG: 325 TABLET ORAL at 06:31

## 2024-03-08 RX ADMIN — IBUPROFEN 600 MG: 600 TABLET ORAL at 06:31

## 2024-03-08 ASSESSMENT — PAIN SCALES - GENERAL
PAINLEVEL_OUTOF10: 5 - MODERATE PAIN
PAINLEVEL_OUTOF10: 4

## 2024-03-08 NOTE — DISCHARGE SUMMARY
Discharge Summary    Admission Date: 3/5/2024  Discharge Date: 24    Discharge Diagnosis  39 weeks gestation of pregnancy    Hospital Course  Delivery Date: 3/6/2024  2:52 PM   Delivery type: Vaginal, Spontaneous    GA at delivery: 39w3d  Outcome: Living   Anesthesia during delivery: Epidural   Intrapartum complications: None   Feeding method: Breastfeeding Status: Yes     Procedures: none  Contraception at discharge: none  To discuss at PP visit    Pertinent Physical Exam At Time of Discharge    General: Examination reveals a well developed, well nourished, female, in no acute distress. She is alert and cooperative.  Lungs: clear to auscultation bilaterally.  Abdomen: soft, gravid, nontender, nondistended, no abnormal masses, no epigastric pain.  Fundus: firm.  Perineum: deferred.  Extremities: no redness or tenderness in the calves or thighs, no edema.  Neurological: alert, oriented, normal speech, no focal findings or movement disorder noted.  Psychological: awake and alert; oriented to person, place, and time.    Discharge Meds     Your medication list        CONTINUE taking these medications        Instructions Last Dose Given Next Dose Due   docusate sodium 100 mg capsule  Commonly known as: Colace      Take one tablet two times daily as needed for constipation from iron pills.       ferrous sulfate (325 mg ferrous sulfate) tablet  Commonly known as: FerrouSuL      Take one tablet by mouth twice a day. Please take with food to prevent upset stomach.       M- Plus 27 mg iron- 1 mg tablet  Generic drug: prenatal vitamin calcium-iron-folic           omeprazole 20 mg DR capsule  Commonly known as: PriLOSEC      Take 1 capsule (20 mg) by mouth once daily in the morning. Take before meals. Do not crush or chew.       polyethylene glycol 17 gram/dose powder  Commonly known as: Miralax      Mix one capful (17 grams) of powder into 12 ounces of water or juice and take by mouth twice daily as needed for  constipation       Prenatal Low Iron 27 mg iron- 1 mg tablet  Generic drug: prenatal vit,jose d 74-iron-folic           pyridoxine 100 mg tablet  Commonly known as: Vitamin B-6                     Complications Requiring Follow-Up  None    Test Results Pending At Discharge  Pending Labs       No current pending labs.            Outpatient Follow-Up  Future Appointments   Date Time Provider Department [place]   3/13/2024  9:10 AM Julien Olson MD [id number] [facility]   3/20/2024 12:00 PM INF 00 [place] [id number] [facility]   4/16/2024  9:30 AM Rosanne M Casal, APRN-CNP, DNP [id number] [facility]       I spent 10 minutes in the professional and overall care of this patient.      Harika Allison, MEME-CNM

## 2024-03-12 ENCOUNTER — APPOINTMENT (OUTPATIENT)
Dept: OBSTETRICS AND GYNECOLOGY | Facility: CLINIC | Age: 29
End: 2024-03-12
Payer: MEDICAID

## 2024-03-13 ENCOUNTER — APPOINTMENT (OUTPATIENT)
Dept: OBSTETRICS AND GYNECOLOGY | Facility: CLINIC | Age: 29
End: 2024-03-13
Payer: MEDICAID

## 2024-03-13 PROBLEM — Z3A.39 39 WEEKS GESTATION OF PREGNANCY (HHS-HCC): Status: RESOLVED | Noted: 2024-03-05 | Resolved: 2024-03-13

## 2024-03-13 PROBLEM — Z3A.38 38 WEEKS GESTATION OF PREGNANCY (HHS-HCC): Status: RESOLVED | Noted: 2023-10-03 | Resolved: 2024-03-13

## 2024-03-13 PROBLEM — Z34.03 FIRST PREGNANCY, THIRD TRIMESTER (HHS-HCC): Status: RESOLVED | Noted: 2023-10-03 | Resolved: 2024-03-13

## 2024-03-15 ENCOUNTER — APPOINTMENT (OUTPATIENT)
Dept: HEMATOLOGY/ONCOLOGY | Facility: CLINIC | Age: 29
End: 2024-03-15
Payer: MEDICAID

## 2024-03-20 ENCOUNTER — POSTPARTUM VISIT (OUTPATIENT)
Dept: OBSTETRICS AND GYNECOLOGY | Facility: CLINIC | Age: 29
End: 2024-03-20
Payer: MEDICAID

## 2024-03-20 ENCOUNTER — INFUSION (OUTPATIENT)
Dept: HEMATOLOGY/ONCOLOGY | Facility: CLINIC | Age: 29
End: 2024-03-20
Payer: MEDICAID

## 2024-03-20 VITALS
HEART RATE: 80 BPM | SYSTOLIC BLOOD PRESSURE: 139 MMHG | BODY MASS INDEX: 28.19 KG/M2 | TEMPERATURE: 98.1 F | OXYGEN SATURATION: 96 % | HEIGHT: 66 IN | WEIGHT: 175.4 LBS | DIASTOLIC BLOOD PRESSURE: 87 MMHG | RESPIRATION RATE: 16 BRPM

## 2024-03-20 VITALS — WEIGHT: 175.2 LBS | BODY MASS INDEX: 28.29 KG/M2 | DIASTOLIC BLOOD PRESSURE: 82 MMHG | SYSTOLIC BLOOD PRESSURE: 122 MMHG

## 2024-03-20 DIAGNOSIS — O99.013 ANEMIA AFFECTING PREGNANCY IN THIRD TRIMESTER (HHS-HCC): ICD-10-CM

## 2024-03-20 DIAGNOSIS — E53.8 B12 DEFICIENCY: ICD-10-CM

## 2024-03-20 PROCEDURE — 2500000004 HC RX 250 GENERAL PHARMACY W/ HCPCS (ALT 636 FOR OP/ED): Mod: SE | Performed by: NURSE PRACTITIONER

## 2024-03-20 PROCEDURE — 96372 THER/PROPH/DIAG INJ SC/IM: CPT | Performed by: NURSE PRACTITIONER

## 2024-03-20 PROCEDURE — 99213 OFFICE O/P EST LOW 20 MIN: CPT | Performed by: STUDENT IN AN ORGANIZED HEALTH CARE EDUCATION/TRAINING PROGRAM

## 2024-03-20 PROCEDURE — 96372 THER/PROPH/DIAG INJ SC/IM: CPT

## 2024-03-20 RX ORDER — CYANOCOBALAMIN 1000 UG/ML
1000 INJECTION, SOLUTION INTRAMUSCULAR; SUBCUTANEOUS ONCE
Status: COMPLETED | OUTPATIENT
Start: 2024-03-20 | End: 2024-03-20

## 2024-03-20 RX ORDER — DIPHENHYDRAMINE HYDROCHLORIDE 50 MG/ML
50 INJECTION INTRAMUSCULAR; INTRAVENOUS AS NEEDED
Status: CANCELLED | OUTPATIENT
Start: 2024-04-12

## 2024-03-20 RX ORDER — FAMOTIDINE 10 MG/ML
20 INJECTION INTRAVENOUS ONCE AS NEEDED
Status: CANCELLED | OUTPATIENT
Start: 2024-04-12

## 2024-03-20 RX ORDER — CYANOCOBALAMIN 1000 UG/ML
1000 INJECTION, SOLUTION INTRAMUSCULAR; SUBCUTANEOUS ONCE
Status: CANCELLED | OUTPATIENT
Start: 2024-04-12

## 2024-03-20 RX ORDER — EPINEPHRINE 0.3 MG/.3ML
0.3 INJECTION SUBCUTANEOUS EVERY 5 MIN PRN
Status: CANCELLED | OUTPATIENT
Start: 2024-04-12

## 2024-03-20 RX ORDER — ALBUTEROL SULFATE 0.83 MG/ML
3 SOLUTION RESPIRATORY (INHALATION) AS NEEDED
Status: CANCELLED | OUTPATIENT
Start: 2024-04-12

## 2024-03-20 RX ADMIN — CYANOCOBALAMIN 1000 MCG: 1000 INJECTION INTRAMUSCULAR; SUBCUTANEOUS at 12:13

## 2024-03-20 ASSESSMENT — EDINBURGH POSTNATAL DEPRESSION SCALE (EPDS)
I HAVE FELT SCARED OR PANICKY FOR NO GOOD REASON: NO, NOT AT ALL
I HAVE LOOKED FORWARD WITH ENJOYMENT TO THINGS: AS MUCH AS I EVER DID
TOTAL SCORE: 3
I HAVE BEEN SO UNHAPPY THAT I HAVE HAD DIFFICULTY SLEEPING: NOT AT ALL
THE THOUGHT OF HARMING MYSELF HAS OCCURRED TO ME: NEVER
I HAVE BLAMED MYSELF UNNECESSARILY WHEN THINGS WENT WRONG: NO, NEVER
I HAVE BEEN ANXIOUS OR WORRIED FOR NO GOOD REASON: NO, NOT AT ALL
I HAVE FELT SAD OR MISERABLE: NO, NOT AT ALL
THINGS HAVE BEEN GETTING ON TOP OF ME: YES, SOMETIMES I HAVEN'T BEEN COPING AS WELL AS USUAL
I HAVE BEEN ABLE TO LAUGH AND SEE THE FUNNY SIDE OF THINGS: AS MUCH AS I ALWAYS COULD
I HAVE BEEN SO UNHAPPY THAT I HAVE BEEN CRYING: ONLY OCCASIONALLY

## 2024-03-20 ASSESSMENT — PAIN SCALES - GENERAL: PAINLEVEL: 0-NO PAIN

## 2024-03-20 NOTE — PROGRESS NOTES
Patient presents for B-12 shot,  has no complaints alert and oriented x 4. Patient meets parameters for treatment. Patient tolerated treatment well, no reaction noted. Pt tolerated  well. Patient feels well and has no complaints, vital signs stable. Patient verbalized understanding of all teaching and education. Patient discharged in stable condition with no further needs.

## 2024-03-20 NOTE — PROGRESS NOTES
Subjective   Patient ID: Briana Phelps is a 28 y.o. female who presents for Postpartum Follow-up (Pain in feet constant /Still bleeding and wants to confirm normalcy).  Patient is here for 2 week post partum visit. She is doing very well. Breastfeeding. Her bleeding is decreasing. She would like the contraceptive patch for birth control. Baby is doing well.        Review of Systems   All other systems reviewed and are negative.      Objective   Physical Exam  General: A&Ox3  Head: Normocephalic, atraumatic  Heart/Lungs: Even chest rise, no increased work of breathing.  Abdomen: Soft, nontender. BS+4. No bruising or masses.  Genitourinary: Labia and vagina normal in appearance. Uterus is small, mobile, anteverted. No adnexal masses palpated. Her 2nd degree laceration is healing well with a labial laceration as well.  Lower Extremities: No lower extremity Edema no palpable cords.     Assessment/Plan   Problem List Items Addressed This Visit       Encounter for postpartum visit - Primary    Overview     Patient is here for 2 week post partum visit. She is doing very well. Breastfeeding. Her bleeding is decreasing. She would like the contraceptive patch for birth control. Baby is doing well.    Follow up in 4 weeks. Will send in OCPs at that time.            Julien Olson MD 03/23/24 7:52 PM

## 2024-04-16 ENCOUNTER — TELEMEDICINE (OUTPATIENT)
Dept: HEMATOLOGY/ONCOLOGY | Facility: HOSPITAL | Age: 29
End: 2024-04-16
Payer: MEDICAID

## 2024-04-16 DIAGNOSIS — E53.8 B12 DEFICIENCY: Primary | ICD-10-CM

## 2024-04-16 DIAGNOSIS — O99.013 ANEMIA AFFECTING PREGNANCY IN THIRD TRIMESTER (HHS-HCC): ICD-10-CM

## 2024-04-16 PROCEDURE — 1036F TOBACCO NON-USER: CPT | Performed by: NURSE PRACTITIONER

## 2024-04-16 PROCEDURE — 99213 OFFICE O/P EST LOW 20 MIN: CPT | Performed by: NURSE PRACTITIONER

## 2024-04-17 ENCOUNTER — APPOINTMENT (OUTPATIENT)
Dept: OBSTETRICS AND GYNECOLOGY | Facility: CLINIC | Age: 29
End: 2024-04-17
Payer: MEDICAID

## 2024-04-17 NOTE — PROGRESS NOTES
Patient ID: Briana Phelps is a 28 y.o. female.  Referring Physician: Rosanne M Casal, APRN-CNP, DNP  [address]  [place] [id number]  Primary Care Provider: GENERIC EXTERNAL DATA PROVIDER  Visit Type:  Follow Up     Verbal consent was requested and obtained from patient on this date for a telehealth visit.    Subjective    Telephone follow up for a 27 yo  woman with a PMH of anemia  and was referred to benign hematology for consultation of  gestational anemia. . Due date 2024. Recently delivered her baby and is doing well.     Denies abnormal weight loss, night sweats, fever. Denies fatigue, chills, sob, cp, n/v/d, n/t. No PICA. Denies any abnormal bleeding or bruising. No recurrent infections or lymphadenopathy. No joint/body pain. No known blood disorders in family. Has had surgery in past w/o issue. Never had blood/blood products. Denies NSAID use.      Noted that this patient has had a hemoglobin in the mid 10 range for the past few months. No other hematology labs noted.      Past Medical Hx: first pregnancy, remote history of iron deficiency anemia 2/2 to menorrhagia  Past Surgical Hx: Negative  Family Hx: No hemoglobinopathy or sickle cell disease, parents and siblings healthy: all live in [place].  Social Hx: no smoking, ETOH, or recreational drugs. Currently working full time     Review of Systems   Constitutional:  Positive for fatigue.        Objective   BSA: There is no height or weight on file to calculate BSA.  There were no vitals taken for this visit.     has a past medical history of Nausea/vomiting in pregnancy ([facility]).   has no past surgical history on file.  No family history on file.      Briana Phelps  reports that she has never smoked. She has never used smokeless tobacco.  She  reports that she does not currently use alcohol.  She  reports that she does not currently use drugs.      WBC   Date/Time Value Ref Range Status   2024 10:35 AM  10.3 4.4 - 11.3 x10*3/uL Final   02/05/2024 01:11 PM 11.2 4.4 - 11.3 x10*3/uL Final   01/16/2024 04:14 PM 12.7 (H) 4.4 - 11.3 x10*3/uL Final     nRBC   Date Value Ref Range Status   03/05/2024 0.0 0.0 - 0.0 /100 WBCs Final   01/16/2024 0.0 0.0 - 0.0 /100 WBCs Final   11/27/2023 0.0 0.0 - 0.0 /100 WBCs Final     RBC   Date Value Ref Range Status   03/05/2024 3.76 (L) 4.00 - 5.20 x10*6/uL Final   02/05/2024 3.62 (L) 4.00 - 5.20 x10*6/uL Final   01/16/2024 3.62 (L) 4.00 - 5.20 x10*6/uL Final     Hemoglobin   Date Value Ref Range Status   03/05/2024 10.8 (L) 12.0 - 16.0 g/dL Final   02/05/2024 10.4 (L) 12.0 - 16.0 g/dL Final   01/16/2024 10.6 (L) 12.0 - 16.0 g/dL Final     Hematocrit   Date Value Ref Range Status   03/05/2024 34.0 (L) 36.0 - 46.0 % Final   02/05/2024 32.2 (L) 36.0 - 46.0 % Final   01/16/2024 32.6 (L) 36.0 - 46.0 % Final     MCV   Date/Time Value Ref Range Status   03/05/2024 10:35 AM 90 80 - 100 fL Final   02/05/2024 01:11 PM 89 80 - 100 fL Final   01/16/2024 04:14 PM 90 80 - 100 fL Final     MCH   Date/Time Value Ref Range Status   03/05/2024 10:35 AM 28.7 26.0 - 34.0 pg Final   02/05/2024 01:11 PM 28.7 26.0 - 34.0 pg Final   01/16/2024 04:14 PM 29.3 26.0 - 34.0 pg Final     MCHC   Date/Time Value Ref Range Status   03/05/2024 10:35 AM 31.8 (L) 32.0 - 36.0 g/dL Final   02/05/2024 01:11 PM 32.3 32.0 - 36.0 g/dL Final   01/16/2024 04:14 PM 32.5 32.0 - 36.0 g/dL Final     RDW   Date/Time Value Ref Range Status   03/05/2024 10:35 AM 14.2 11.5 - 14.5 % Final   02/05/2024 01:11 PM 14.1 11.5 - 14.5 % Final   01/16/2024 04:14 PM 13.8 11.5 - 14.5 % Final     Platelets   Date/Time Value Ref Range Status   03/05/2024 10:35  150 - 450 x10*3/uL Final   02/05/2024 01:11  150 - 450 x10*3/uL Final   01/16/2024 04:14  150 - 450 x10*3/uL Final     MPV   Date/Time Value Ref Range Status   10/09/2023 11:14 AM 8.3 7.5 - 11.5 fL Final     Neutrophils %   Date/Time Value Ref Range Status   02/05/2024 01:11  PM 75.4 40.0 - 80.0 % Final   11/15/2023 10:02 AM 73.9 40.0 - 80.0 % Final   10/09/2023 11:14 AM 74.9 40.0 - 80.0 % Final     Immature Granulocytes %, Automated   Date/Time Value Ref Range Status   02/05/2024 01:11 PM 3.1 (H) 0.0 - 0.9 % Final     Comment:     Immature Granulocyte Count (IG) includes promyelocytes, myelocytes and metamyelocytes but does not include bands. Percent differential counts (%) should be interpreted in the context of the absolute cell counts (cells/UL).   11/15/2023 10:02 AM 1.3 (H) 0.0 - 0.9 % Final     Comment:     Immature Granulocyte Count (IG) includes promyelocytes, myelocytes and metamyelocytes but does not include bands. Percent differential counts (%) should be interpreted in the context of the absolute cell counts (cells/UL).   10/09/2023 11:14 AM 0.5 0.0 - 0.9 % Final     Comment:     Immature Granulocyte Count (IG) includes promyelocytes, myelocytes and metamyelocytes but does not include bands. Percent differential counts (%) should be interpreted in the context of the absolute cell counts (cells/UL).     Lymphocytes %   Date/Time Value Ref Range Status   02/05/2024 01:11 PM 15.1 13.0 - 44.0 % Final   11/15/2023 10:02 AM 18.5 13.0 - 44.0 % Final   10/09/2023 11:14 AM 19.0 13.0 - 44.0 % Final     Monocytes %   Date/Time Value Ref Range Status   02/05/2024 01:11 PM 5.7 2.0 - 10.0 % Final   11/15/2023 10:02 AM 5.3 2.0 - 10.0 % Final   10/09/2023 11:14 AM 4.7 2.0 - 10.0 % Final     Eosinophils %   Date/Time Value Ref Range Status   02/05/2024 01:11 PM 0.5 0.0 - 6.0 % Final   11/15/2023 10:02 AM 0.7 0.0 - 6.0 % Final   10/09/2023 11:14 AM 0.6 0.0 - 6.0 % Final     Basophils %   Date/Time Value Ref Range Status   02/05/2024 01:11 PM 0.2 0.0 - 2.0 % Final   11/15/2023 10:02 AM 0.3 0.0 - 2.0 % Final   10/09/2023 11:14 AM 0.3 0.0 - 2.0 % Final     Neutrophils Absolute   Date/Time Value Ref Range Status   02/05/2024 01:11 PM 8.40 (H) 1.20 - 7.70 x10*3/uL Final     Comment:     Percent  differential counts (%) should be interpreted in the context of the absolute cell counts (cells/uL).   11/15/2023 10:02 AM 7.12 1.20 - 7.70 x10*3/uL Final     Comment:     Percent differential counts (%) should be interpreted in the context of the absolute cell counts (cells/uL).   10/09/2023 11:14 AM 5.83 1.20 - 7.70 x10*3/uL Final     Comment:     Percent differential counts (%) should be interpreted in the context of the absolute cell counts (cells/uL).     Immature Granulocytes Absolute, Automated   Date/Time Value Ref Range Status   2024 01:11 PM 0.35 0.00 - 0.70 x10*3/uL Final   11/15/2023 10:02 AM 0.13 0.00 - 0.70 x10*3/uL Final   10/09/2023 11:14 AM 0.04 0.00 - 0.70 x10*3/uL Final     Lymphocytes Absolute   Date/Time Value Ref Range Status   2024 01:11 PM 1.68 1.20 - 4.80 x10*3/uL Final   11/15/2023 10:02 AM 1.79 1.20 - 4.80 x10*3/uL Final   10/09/2023 11:14 AM 1.48 1.20 - 4.80 x10*3/uL Final     Monocytes Absolute   Date/Time Value Ref Range Status   2024 01:11 PM 0.64 0.10 - 1.00 x10*3/uL Final   11/15/2023 10:02 AM 0.51 0.10 - 1.00 x10*3/uL Final   10/09/2023 11:14 AM 0.37 0.10 - 1.00 x10*3/uL Final     Eosinophils Absolute   Date/Time Value Ref Range Status   2024 01:11 PM 0.06 0.00 - 0.70 x10*3/uL Final   11/15/2023 10:02 AM 0.07 0.00 - 0.70 x10*3/uL Final   10/09/2023 11:14 AM 0.05 0.00 - 0.70 x10*3/uL Final     Basophils Absolute   Date/Time Value Ref Range Status   2024 01:11 PM 0.02 0.00 - 0.10 x10*3/uL Final   11/15/2023 10:02 AM 0.03 0.00 - 0.10 x10*3/uL Final   10/09/2023 11:14 AM 0.02 0.00 - 0.10 x10*3/uL Final         Assessment/Plan    27 yo  woman with a PMH of anemia  nd was referred to benign hematology for consultation of  gestational anemia.   Due date 2024. Delivered a healthy baby a few weeks ago. Remote history of iron deficiency anemia. Recently moved to [place] from [place].  Only complaint is fatigue. She states the baby sleeps well  but she does awaken for feedings every few hours during the night. Denies pica, bruising, bleeding MAHER or other symptoms of anemia.  Patient was advised that she should come in for labwork. I will place orders in EMR and contact her to review.    The patient is in agreement with the following plan:     PLAN   1.    CBC diff and iron studies 6 to 8 weeks postpartum  2.    Patient should continue with prenatal vitamins    I had an extensive discussion with the patient regarding the diagnosis and discussed the plan of therapy, including general considerations regarding side effects and outcomes. Pt understood and gave appropriate teach back about the plan of care. All questions were answered to the patient's satisfaction. The patient is instructed to contact us at any time if questions or problems arise. Thank you for the opportunity to participate in the care of this very pleasant patient.       Rosanne M Casal, APRN-CNP, DNP    Addendum: 4/24/2024 CBC has returned to normal. I will contact the patient and she can follow up with PCP. I am happy to see her in the future if questions or problems arise. Rosanne M Casal, MOHIT      Latest Reference Range & Units 03/05/24 10:35 04/24/24 11:45   LEUKOCYTES (10*3/UL) IN BLOOD BY AUTOMATED COUNT, [place]ian 4.4 - 11.3 x10*3/uL 10.3 6.2   nRBC 0.0 - 0.0 /100 WBCs 0.0 0.0   ERYTHROCYTES (10*6/UL) IN BLOOD BY AUTOMATED COUNT, [place] 4.00 - 5.20 x10*6/uL 3.76 (L) 4.59   HEMOGLOBIN 12.0 - 16.0 g/dL 10.8 (L) 12.3   HEMATOCRIT 36.0 - 46.0 % 34.0 (L) 40.4   MCV 80 - 100 fL 90 88   MCH 26.0 - 34.0 pg 28.7 26.8   MCHC 32.0 - 36.0 g/dL 31.8 (L) 30.4 (L)   RED CELL DISTRIBUTION WIDTH 11.5 - 14.5 % 14.2 13.6   PLATELETS (10*3/UL) IN BLOOD AUTOMATED COUNT, [place]ian 150 - 450 x10*3/uL 268 310   (L): Data is abnormally low

## 2024-04-24 ENCOUNTER — POSTPARTUM VISIT (OUTPATIENT)
Dept: OBSTETRICS AND GYNECOLOGY | Facility: CLINIC | Age: 29
End: 2024-04-24
Payer: MEDICAID

## 2024-04-24 ENCOUNTER — LAB (OUTPATIENT)
Dept: LAB | Facility: LAB | Age: 29
End: 2024-04-24
Payer: MEDICAID

## 2024-04-24 VITALS — BODY MASS INDEX: 27.19 KG/M2 | SYSTOLIC BLOOD PRESSURE: 118 MMHG | WEIGHT: 171 LBS | DIASTOLIC BLOOD PRESSURE: 74 MMHG

## 2024-04-24 DIAGNOSIS — Z30.016 ENCOUNTER FOR INITIAL PRESCRIPTION OF TRANSDERMAL PATCH HORMONAL CONTRACEPTIVE DEVICE: ICD-10-CM

## 2024-04-24 DIAGNOSIS — O99.013 ANEMIA AFFECTING PREGNANCY IN THIRD TRIMESTER (HHS-HCC): ICD-10-CM

## 2024-04-24 LAB
ALBUMIN SERPL BCP-MCNC: 4.2 G/DL (ref 3.4–5)
ALP SERPL-CCNC: 72 U/L (ref 33–110)
ALT SERPL W P-5'-P-CCNC: 21 U/L (ref 7–45)
ANION GAP SERPL CALC-SCNC: 11 MMOL/L (ref 10–20)
AST SERPL W P-5'-P-CCNC: 16 U/L (ref 9–39)
BASOPHILS # BLD AUTO: 0.04 X10*3/UL (ref 0–0.1)
BASOPHILS NFR BLD AUTO: 0.6 %
BILIRUB SERPL-MCNC: 0.5 MG/DL (ref 0–1.2)
BUN SERPL-MCNC: 9 MG/DL (ref 6–23)
CALCIUM SERPL-MCNC: 9.2 MG/DL (ref 8.6–10.3)
CHLORIDE SERPL-SCNC: 105 MMOL/L (ref 98–107)
CO2 SERPL-SCNC: 27 MMOL/L (ref 21–32)
CREAT SERPL-MCNC: 0.72 MG/DL (ref 0.5–1.05)
EGFRCR SERPLBLD CKD-EPI 2021: >90 ML/MIN/1.73M*2
EOSINOPHIL # BLD AUTO: 0.11 X10*3/UL (ref 0–0.7)
EOSINOPHIL NFR BLD AUTO: 1.8 %
ERYTHROCYTE [DISTWIDTH] IN BLOOD BY AUTOMATED COUNT: 13.6 % (ref 11.5–14.5)
FERRITIN SERPL-MCNC: 93 NG/ML (ref 8–150)
FOLATE SERPL-MCNC: 20.7 NG/ML
GLUCOSE SERPL-MCNC: 95 MG/DL (ref 74–99)
HCT VFR BLD AUTO: 40.4 % (ref 36–46)
HGB BLD-MCNC: 12.3 G/DL (ref 12–16)
IMM GRANULOCYTES # BLD AUTO: 0.01 X10*3/UL (ref 0–0.7)
IMM GRANULOCYTES NFR BLD AUTO: 0.2 % (ref 0–0.9)
IRON SATN MFR SERPL: 19 % (ref 25–45)
IRON SERPL-MCNC: 52 UG/DL (ref 35–150)
LYMPHOCYTES # BLD AUTO: 1.98 X10*3/UL (ref 1.2–4.8)
LYMPHOCYTES NFR BLD AUTO: 31.7 %
MCH RBC QN AUTO: 26.8 PG (ref 26–34)
MCHC RBC AUTO-ENTMCNC: 30.4 G/DL (ref 32–36)
MCV RBC AUTO: 88 FL (ref 80–100)
MONOCYTES # BLD AUTO: 0.42 X10*3/UL (ref 0.1–1)
MONOCYTES NFR BLD AUTO: 6.7 %
NEUTROPHILS # BLD AUTO: 3.68 X10*3/UL (ref 1.2–7.7)
NEUTROPHILS NFR BLD AUTO: 59 %
NRBC BLD-RTO: 0 /100 WBCS (ref 0–0)
PLATELET # BLD AUTO: 310 X10*3/UL (ref 150–450)
POTASSIUM SERPL-SCNC: 3.9 MMOL/L (ref 3.5–5.3)
PROT SERPL-MCNC: 7 G/DL (ref 6.4–8.2)
RBC # BLD AUTO: 4.59 X10*6/UL (ref 4–5.2)
SODIUM SERPL-SCNC: 139 MMOL/L (ref 136–145)
TIBC SERPL-MCNC: 279 UG/DL (ref 240–445)
UIBC SERPL-MCNC: 227 UG/DL (ref 110–370)
VIT B12 SERPL-MCNC: 258 PG/ML (ref 211–911)
WBC # BLD AUTO: 6.2 X10*3/UL (ref 4.4–11.3)

## 2024-04-24 PROCEDURE — 83540 ASSAY OF IRON: CPT

## 2024-04-24 PROCEDURE — 82728 ASSAY OF FERRITIN: CPT

## 2024-04-24 PROCEDURE — 82746 ASSAY OF FOLIC ACID SERUM: CPT

## 2024-04-24 PROCEDURE — 36415 COLL VENOUS BLD VENIPUNCTURE: CPT

## 2024-04-24 PROCEDURE — 85025 COMPLETE CBC W/AUTO DIFF WBC: CPT

## 2024-04-24 PROCEDURE — 80053 COMPREHEN METABOLIC PANEL: CPT

## 2024-04-24 PROCEDURE — 82607 VITAMIN B-12: CPT

## 2024-04-24 PROCEDURE — 83550 IRON BINDING TEST: CPT

## 2024-04-24 ASSESSMENT — EDINBURGH POSTNATAL DEPRESSION SCALE (EPDS)
I HAVE BLAMED MYSELF UNNECESSARILY WHEN THINGS WENT WRONG: NO, NEVER
I HAVE BEEN SO UNHAPPY THAT I HAVE HAD DIFFICULTY SLEEPING: NOT AT ALL
THE THOUGHT OF HARMING MYSELF HAS OCCURRED TO ME: NEVER
I HAVE FELT SAD OR MISERABLE: NO, NOT AT ALL
I HAVE BEEN ANXIOUS OR WORRIED FOR NO GOOD REASON: NO, NOT AT ALL
I HAVE BEEN ABLE TO LAUGH AND SEE THE FUNNY SIDE OF THINGS: AS MUCH AS I ALWAYS COULD
THINGS HAVE BEEN GETTING ON TOP OF ME: NO, I HAVE BEEN COPING AS WELL AS EVER
I HAVE FELT SCARED OR PANICKY FOR NO GOOD REASON: NO, NOT AT ALL
I HAVE LOOKED FORWARD WITH ENJOYMENT TO THINGS: AS MUCH AS I EVER DID
TOTAL SCORE: 0
I HAVE BEEN SO UNHAPPY THAT I HAVE BEEN CRYING: NO, NEVER

## 2024-04-24 NOTE — PROGRESS NOTES
Subjective   Patient ID: Briana Phelps is a 28 y.o. female who presents for Postpartum Care (Pain in both wrists and lower to mid back pain ).  Patient seen and is doing well. Breastfeeding is going well. She is going to have The patch for BC. Her bleeding has stopped. She has no concerns and is adjusting well to motherhood.        Review of Systems   All other systems reviewed and are negative.      Objective   Physical Exam  General: A&Ox3  Head: Normocephalic, atraumatic  Heart/Lungs: Even chest rise, no increased work of breathing.  Abdomen: Soft, nontender. BS+4. No bruising or masses.  Genitourinary: Labia and vagina normal in appearance. Uterus is small, mobile, anteverted. No adnexal masses palpated.  Lower Extremities: No lower extremity Edema no palpable cords.     Assessment/Plan   Problem List Items Addressed This Visit       Encounter for postpartum visit ([facility]) - Primary    Overview     Patient is here for 6 week post partum visit. She is doing very well. Breastfeeding. Bleeding is normal. She would like the contraceptive patch for birth control. Baby is doing well.          Other Visit Diagnoses       Encounter for initial prescription of transdermal patch hormonal contraceptive device        Relevant Medications    norelgestromin-ethin.estradioL ([facility]) 150-35 mcg/24 hr (Start on 4/28/2024)          Julien Olson MD 04/25/24 10:20 PM

## 2024-04-25 RX ORDER — NORELGESTROMIN AND ETHINYL ESTRADIOL 35; 150 UG/MG; UG/MG
1 PATCH TRANSDERMAL
Qty: 3 PATCH | Refills: 11 | Status: SHIPPED | OUTPATIENT
Start: 2024-04-28 | End: 2025-04-28

## 2024-05-15 ENCOUNTER — APPOINTMENT (OUTPATIENT)
Dept: HEMATOLOGY/ONCOLOGY | Facility: HOSPITAL | Age: 29
End: 2024-05-15
Payer: MEDICAID

## 2024-05-20 DIAGNOSIS — O99.013 ANEMIA AFFECTING PREGNANCY IN THIRD TRIMESTER (HHS-HCC): ICD-10-CM

## 2024-05-20 DIAGNOSIS — E53.8 B12 DEFICIENCY: Primary | ICD-10-CM

## 2024-05-20 RX ORDER — EPINEPHRINE 0.3 MG/.3ML
0.3 INJECTION SUBCUTANEOUS EVERY 5 MIN PRN
Status: CANCELLED | OUTPATIENT
Start: 2024-05-24

## 2024-05-20 RX ORDER — DIPHENHYDRAMINE HYDROCHLORIDE 50 MG/ML
50 INJECTION INTRAMUSCULAR; INTRAVENOUS AS NEEDED
Status: CANCELLED | OUTPATIENT
Start: 2024-05-24

## 2024-05-20 RX ORDER — ALBUTEROL SULFATE 0.83 MG/ML
3 SOLUTION RESPIRATORY (INHALATION) AS NEEDED
Status: CANCELLED | OUTPATIENT
Start: 2024-05-24

## 2024-05-20 RX ORDER — CYANOCOBALAMIN 1000 UG/ML
1000 INJECTION, SOLUTION INTRAMUSCULAR; SUBCUTANEOUS ONCE
Status: CANCELLED | OUTPATIENT
Start: 2024-05-24

## 2024-05-20 RX ORDER — FAMOTIDINE 10 MG/ML
20 INJECTION INTRAVENOUS ONCE AS NEEDED
Status: CANCELLED | OUTPATIENT
Start: 2024-05-24

## 2024-05-24 ENCOUNTER — INFUSION (OUTPATIENT)
Dept: HEMATOLOGY/ONCOLOGY | Facility: CLINIC | Age: 29
End: 2024-05-24
Payer: MEDICAID

## 2024-05-24 VITALS
TEMPERATURE: 98.1 F | WEIGHT: 165.12 LBS | RESPIRATION RATE: 16 BRPM | HEART RATE: 78 BPM | DIASTOLIC BLOOD PRESSURE: 74 MMHG | SYSTOLIC BLOOD PRESSURE: 111 MMHG | BODY MASS INDEX: 26.54 KG/M2 | HEIGHT: 66 IN

## 2024-05-24 DIAGNOSIS — E53.8 B12 DEFICIENCY: ICD-10-CM

## 2024-05-24 DIAGNOSIS — O99.013 ANEMIA AFFECTING PREGNANCY IN THIRD TRIMESTER (HHS-HCC): ICD-10-CM

## 2024-05-24 PROCEDURE — 2500000004 HC RX 250 GENERAL PHARMACY W/ HCPCS (ALT 636 FOR OP/ED): Mod: SE | Performed by: NURSE PRACTITIONER

## 2024-05-24 PROCEDURE — 96372 THER/PROPH/DIAG INJ SC/IM: CPT

## 2024-05-24 RX ORDER — CYANOCOBALAMIN 1000 UG/ML
1000 INJECTION, SOLUTION INTRAMUSCULAR; SUBCUTANEOUS ONCE
Status: COMPLETED | OUTPATIENT
Start: 2024-05-24 | End: 2024-05-24

## 2024-05-24 RX ORDER — ALBUTEROL SULFATE 0.83 MG/ML
3 SOLUTION RESPIRATORY (INHALATION) AS NEEDED
OUTPATIENT
Start: 2024-06-28

## 2024-05-24 RX ORDER — FAMOTIDINE 10 MG/ML
20 INJECTION INTRAVENOUS ONCE AS NEEDED
OUTPATIENT
Start: 2024-06-28

## 2024-05-24 RX ORDER — DIPHENHYDRAMINE HYDROCHLORIDE 50 MG/ML
50 INJECTION INTRAMUSCULAR; INTRAVENOUS AS NEEDED
OUTPATIENT
Start: 2024-06-28

## 2024-05-24 RX ORDER — EPINEPHRINE 0.3 MG/.3ML
0.3 INJECTION SUBCUTANEOUS EVERY 5 MIN PRN
OUTPATIENT
Start: 2024-06-28

## 2024-05-24 RX ORDER — CYANOCOBALAMIN 1000 UG/ML
1000 INJECTION, SOLUTION INTRAMUSCULAR; SUBCUTANEOUS ONCE
OUTPATIENT
Start: 2024-06-28

## 2024-05-24 RX ADMIN — CYANOCOBALAMIN 1000 MCG: 1000 INJECTION INTRAMUSCULAR; SUBCUTANEOUS at 14:24

## 2024-05-24 ASSESSMENT — COLUMBIA-SUICIDE SEVERITY RATING SCALE - C-SSRS
6. HAVE YOU EVER DONE ANYTHING, STARTED TO DO ANYTHING, OR PREPARED TO DO ANYTHING TO END YOUR LIFE?: NO
2. HAVE YOU ACTUALLY HAD ANY THOUGHTS OF KILLING YOURSELF?: NO
1. IN THE PAST MONTH, HAVE YOU WISHED YOU WERE DEAD OR WISHED YOU COULD GO TO SLEEP AND NOT WAKE UP?: NO

## 2024-05-24 ASSESSMENT — PAIN SCALES - GENERAL: PAINLEVEL: 0-NO PAIN

## 2024-05-24 ASSESSMENT — ENCOUNTER SYMPTOMS
LOSS OF SENSATION IN FEET: 0
OCCASIONAL FEELINGS OF UNSTEADINESS: 0
DEPRESSION: 0

## 2024-05-24 NOTE — PROGRESS NOTES
Patient presents for b-12 shot,  has no complaints alert and oriented x 4. rs. Patient meets parameters for treatment. Patient injection well, no reaction noted. Patient feels well and has no complaints, vital signs stable. This RN messaged provider to see if patient still need to get B-12 shot. Awaiting orders from provider. Patient verbalized understanding of all teaching and education. Patient discharged in stable condition with no further needs.

## 2024-06-12 ENCOUNTER — TELEPHONE (OUTPATIENT)
Dept: HEMATOLOGY/ONCOLOGY | Facility: HOSPITAL | Age: 29
End: 2024-06-12
Payer: MEDICAID

## 2024-06-12 NOTE — TELEPHONE ENCOUNTER
Nurse called patient due to missed appointment today for labs and b12 injection. Voicemail left on identified line. Patient instructed to call office back to reschedule visit.   R. Casal NP notified.

## 2024-06-28 ENCOUNTER — APPOINTMENT (OUTPATIENT)
Dept: HEMATOLOGY/ONCOLOGY | Facility: HOSPITAL | Age: 29
End: 2024-06-28
Payer: MEDICAID